# Patient Record
Sex: FEMALE | Race: WHITE | Employment: FULL TIME | URBAN - METROPOLITAN AREA
[De-identification: names, ages, dates, MRNs, and addresses within clinical notes are randomized per-mention and may not be internally consistent; named-entity substitution may affect disease eponyms.]

---

## 2017-10-16 ENCOUNTER — HOSPITAL ENCOUNTER (OUTPATIENT)
Dept: MAMMOGRAPHY | Age: 62
Discharge: HOME | End: 2017-10-16
Attending: FAMILY MEDICINE | Admitting: FAMILY MEDICINE

## 2017-10-16 DIAGNOSIS — Z12.31 ENCOUNTER FOR SCREENING MAMMOGRAM FOR MALIGNANT NEOPLASM OF BREAST: ICD-10-CM

## 2018-12-10 ENCOUNTER — HOSPITAL ENCOUNTER (OUTPATIENT)
Dept: WOMENS IMAGING | Age: 63
Discharge: HOME | End: 2018-12-10
Payer: COMMERCIAL

## 2018-12-10 DIAGNOSIS — Z12.31 ENCOUNTER FOR SCREENING MAMMOGRAM FOR MALIGNANT NEOPLASM OF BREAST: ICD-10-CM

## 2018-12-10 PROCEDURE — 77067 SCR MAMMO BI INCL CAD: CPT

## 2020-06-30 ENCOUNTER — HOSPITAL ENCOUNTER (OUTPATIENT)
Dept: WOMENS IMAGING | Age: 65
Discharge: HOME | End: 2020-06-30
Payer: COMMERCIAL

## 2020-06-30 ENCOUNTER — TELEPHONE (OUTPATIENT)
Dept: WOMENS IMAGING | Age: 65
End: 2020-06-30

## 2020-06-30 PROCEDURE — 77067 SCR MAMMO BI INCL CAD: CPT

## 2020-07-27 ENCOUNTER — OFFICE VISIT (OUTPATIENT)
Dept: ORTHOPEDIC SURGERY | Age: 65
End: 2020-07-27
Payer: COMMERCIAL

## 2020-07-27 VITALS — HEIGHT: 65 IN | WEIGHT: 293 LBS | BODY MASS INDEX: 48.82 KG/M2

## 2020-07-27 PROCEDURE — 99243 OFF/OP CNSLTJ NEW/EST LOW 30: CPT | Performed by: ORTHOPAEDIC SURGERY

## 2020-07-27 NOTE — PROGRESS NOTES
Chief Complaint    Knee Pain (lt knee pain, pops all the time, hurts to stand for long period of time; ongoing for awhile )      History of Present Illness:  Diego Gar is a 59 y.o. female who presents today for evaluation of left knee pain. Patient's primary care physician is Fior Carbajal. Patient states that she has been experiencing left knee pain for years and does not remember any  specific injury to the left knee. 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 She states that her pain on average is a 5-7/10 and increased with prolonged weightbearing activity and with ascending descending steps. Patient states that at times when she rises from seated position she has to stand and she states that she has to get her knee in alignment before she can walk. After few steps she states that the pain improves. She has not ambulating with an assistive device at the present time but she does favor her left lower extremity. Pain is mainly over the medial aspect of the left knee and over the anterior aspect of the knee with ascending descending steps. She presently takes a combination of 800 mg of ibuprofen daily with thousand milligrams of Tylenol a day. She has not seen her primary care physician in several years and at the present time she takes no prescriptive medication for any reason. She denies any  cardiac issues, pulmonary issues, diabetes, or GI problems. Pain Assessment  Location of Pain: Knee  Location Modifiers: Left  Severity of Pain: 6  Frequency of Pain: Intermittent  Aggravating Factors: Standing, Walking, Stairs  Limiting Behavior: Some  Result of Injury: No  Work-Related Injury: No  Are there other pain locations you wish to document?: No]       Medical History:  Patient's medications, allergies, past medical, surgical, social and family histories were reviewed and updated as appropriate.     Review of Systems:  Relevant review of systems reviewed and available in the patient's obtained and reviewed in office:  Views 4 views including AP weightbearing, PA flexed weightbearing, lateral, and skyline  Location left knee:    Impression:   There is end-stage osteoarthritis of the left knee with sclerosis and osteophyte formation present. There is subluxation of the tibiofemoral joint noted. The patellofemoral joint as moderate osteoarthritis with peripheral osteophyte formation noted. No fractures are identified. Impression:  Encounter Diagnoses   Name Primary?  Left knee pain, unspecified chronicity Yes    Morbid obesity with BMI of 50.0-59.9, adult (HCC)     Primary osteoarthritis of left knee        Office Procedures:  Orders Placed This Encounter   Procedures    XR KNEE LEFT (MIN 4 VIEWS)     Standing Status:   Future     Number of Occurrences:   1     Standing Expiration Date:   7/23/2021    [employer]     Referral Priority:   Routine     Referral Type:   Consult for Advice and Opinion     Referral Reason:   Specialty Services Required     Requested Specialty:   Nutrition     Number of Visits Requested:   1       Treatment Plan:  I discussed with the patient the nature of osteoarthritis of the knee. We talked about treatment of arthritis and the various options that are involved with this. The patient understands that the treatments can vary from essentially doing nothing to a total joint replacement arthroplasty for arthritis. I then went on to describe the utilization of glucosamine and chondroitin sulfate as a joint nutrition product. We talked about the fact that this is essentially a joint vitamin with typically minimal side effects. We also talked about utilization of prescription over-the-counter anti-inflammatory medications as the next option. We also discussed the possibility of brace wear or orthotic wear if the patient has significant varus alignment.  We then went on to discuss the possibility of Visco supplementation with hyaluronate

## 2022-10-18 ENCOUNTER — HOSPITAL ENCOUNTER (OUTPATIENT)
Dept: WOMENS IMAGING | Age: 67
Discharge: HOME | End: 2022-10-18
Payer: MEDICARE

## 2022-10-18 DIAGNOSIS — Z12.31 ENCOUNTER FOR SCREENING MAMMOGRAM FOR BREAST CANCER: ICD-10-CM

## 2022-10-18 PROCEDURE — 77063 BREAST TOMOSYNTHESIS BI: CPT

## 2023-11-07 ENCOUNTER — HOSPITAL ENCOUNTER (EMERGENCY)
Age: 68
Discharge: HOME | End: 2023-11-07
Payer: MEDICARE

## 2023-11-07 ENCOUNTER — APPOINTMENT (OUTPATIENT)
Dept: GENERAL RADIOLOGY | Age: 68
End: 2023-11-07
Payer: MEDICARE

## 2023-11-07 VITALS
HEIGHT: 65 IN | RESPIRATION RATE: 16 BRPM | OXYGEN SATURATION: 99 % | DIASTOLIC BLOOD PRESSURE: 95 MMHG | BODY MASS INDEX: 54.75 KG/M2 | SYSTOLIC BLOOD PRESSURE: 166 MMHG | HEART RATE: 92 BPM | TEMPERATURE: 97.9 F

## 2023-11-07 DIAGNOSIS — M79.605 LEFT LEG PAIN: Primary | ICD-10-CM

## 2023-11-07 PROCEDURE — 99283 EMERGENCY DEPT VISIT LOW MDM: CPT

## 2023-11-07 PROCEDURE — 73560 X-RAY EXAM OF KNEE 1 OR 2: CPT

## 2023-11-07 ASSESSMENT — PAIN - FUNCTIONAL ASSESSMENT: PAIN_FUNCTIONAL_ASSESSMENT: NONE - DENIES PAIN

## 2023-11-07 NOTE — ED PROVIDER NOTES
[address] [place] [address]  ED  EMERGENCY DEPARTMENT ENCOUNTER        Pt Name: Sheri Kaiesr  MRN: [id number]  [address] [facility] 1955  Date of evaluation: 11/7/2023  Provider: POWER Bowen  PCP: Shell Lynn DO  Note Started: 6:05 PM EST 11/7/23      PARMJIT. I have evaluated this patient. CHIEF COMPLAINT       Chief Complaint   Patient presents with    Leg Pain     Pt reports left lower leg pain, pt denies acute injury. Pt reports pain in located in the anterior aspect of her left lower leg. Pt denies any long periods of travel, immobilization. Pt reports pain is only present with ambulation. HISTORY OF PRESENT ILLNESS: 1 or more Elements     History from : Patient    Limitations to history : None    Sheri Kaiser is a 76 y.o. female who presents to the emergency department complaining of left leg pain. Patient states over the past several days she has had progressive pain in her left knee. She does not have any difficulty moving her knee or leg however pain is significantly worse when ambulating and weightbearing. Pain is primarily anterior left knee. She denies any significant swelling although difficult to assess due to patient's body habitus. Denies any recent surgical history. Denies any known injuries. She has not fallen. At rest she denies any pain. She has tried taking ibuprofen with some relief of symptoms however states today pain has been worse than over the past several days and she has been using a cane. Due to the worsening of pain she came into the emergency department for evaluation. Nursing Notes were all reviewed and agreed with or any disagreements were addressed in the HPI. REVIEW OF SYSTEMS :      Review of Systems    Positives and Pertinent negatives as per HPI. SURGICAL HISTORY   History reviewed. No pertinent surgical history.     CURRENTMEDICATIONS       Previous Medications    No medications on file       ALLERGIES     Patient has no known

## 2023-11-08 NOTE — ED NOTES
Discharge instructions explained by ED provider. Patient verbalized understanding and denies any other concerns or complaints at this time. Patient vital signs stable and no acute signs or symptoms of distress noted at discharge. Patient deemed clinically stable. Patient d/c home.        Ellene Oppenheim, RN  11/07/23 2001

## 2024-07-23 ENCOUNTER — HOSPITAL ENCOUNTER (OUTPATIENT)
Dept: PHYSICAL THERAPY | Age: 69
Setting detail: THERAPIES SERIES
Discharge: HOME | End: 2024-07-23
Payer: MEDICARE

## 2024-07-23 PROCEDURE — 97110 THERAPEUTIC EXERCISES: CPT

## 2024-07-23 PROCEDURE — 97161 PT EVAL LOW COMPLEX 20 MIN: CPT

## 2024-07-23 PROCEDURE — 97016 VASOPNEUMATIC DEVICE THERAPY: CPT

## 2024-07-23 NOTE — PLAN OF CARE
Independent in HEP and progression per patient tolerance, in order to prevent re-injury.   [] Progressing: [] Met: [] Not Met: [] Adjusted  2. Patient will have a decrease in pain to <2/10 to facilitate improvement in movement, function, and ADLs as indicated by Functional Deficits.  [] Progressing: [] Met: [] Not Met: [] Adjusted    Long Term Goals: To be achieved in: 8 weeks  1. Disability index score of 20% or less for the LEFS to assist with reaching prior level of function with activities such as ADL's.  [] Progressing: [] Met: [] Not Met: [] Adjusted  2. Patient will demonstrate increased AROM of L LE to WNL without pain to allow for proper joint functioning to enable patient to squat.   [] Progressing: [] Met: [] Not Met: [] Adjusted  3. Patient will demonstrate increased Strength of R LE to at least 4+/5 throughout without pain to allow for proper functional mobility to enable patient to return to household chores.   [] Progressing: [] Met: [] Not Met: [] Adjusted  4. Patient will return to work without increased symptoms or restriction.   [] Progressing: [] Met: [] Not Met: [] Adjusted  5. Patient to ambulate community distances on varied surfaces independently without AD, and will ambulate up/down 4 steps with an alternating pattern with unilateral rail independently with good control and without deviations.  [] Progressing: [] Met: [] Not Met: [] Adjusted     Overall Progression Towards Functional goals/ Treatment Progress Update:  [] Patient is progressing as expected towards functional goals listed.    [] Progression is slowed due to complexities/Impairments listed.  [] Progression has been slowed due to co-morbidities.  [x] Plan just implemented, too soon (<30days) to assess goals progression   [] Goals require adjustment due to lack of progress  [] Patient is not progressing as expected and requires additional follow up with physician  [] Other:     TREATMENT PLAN     Frequency/Duration: 2x/week for

## 2024-07-29 ENCOUNTER — HOSPITAL ENCOUNTER (OUTPATIENT)
Dept: PHYSICAL THERAPY | Age: 69
Setting detail: THERAPIES SERIES
End: 2024-07-29
Payer: MEDICARE

## 2024-07-31 ENCOUNTER — HOSPITAL ENCOUNTER (OUTPATIENT)
Dept: PHYSICAL THERAPY | Age: 69
Setting detail: THERAPIES SERIES
Discharge: HOME | End: 2024-07-31
Payer: MEDICARE

## 2024-07-31 PROCEDURE — 97110 THERAPEUTIC EXERCISES: CPT

## 2024-07-31 PROCEDURE — 97016 VASOPNEUMATIC DEVICE THERAPY: CPT

## 2024-07-31 NOTE — FLOWSHEET NOTE
[facility]- Outpatient Rehabilitation and Therapy [address] [place] [address], [address], [place] [address] office: [phone] fax: [phone]         Physical Therapy: TREATMENT/PROGRESS NOTE   Patient: Mercy Fuentes (68 y.o. female)   Examination Date: 2024   :  1955 MRN: [id number]   Visit #: 2   Insurance Allowable Auth Needed   Med nec  (No exclusions) []Yes    [x]No    Insurance: Payor: AETNA MEDICARE / Plan: AETNA MEDICARE-ADVANTAGE PPO / Product Type: Medicare /   Insurance ID: [id number] - (Medicare Managed)  Secondary Insurance (if applicable):    Treatment Diagnosis:   Muscle tightness (M62.9), weakness (R53.1) , abnormal gait (R26.9) ,balance disorder (R26.89), effusion (M25.40)   Medical Diagnosis:  L lower leg pain (M79.662)   Referring Physician: Shane Saucedo MD  PCP: Torres Zavala DO     Plan of care signed (Y/N): N    Date of Patient follow up with Physician: 2024     Progress Report/POC: NO  POC update due: (10 visits /OR AUTH LIMITS, whichever is less) 24                                              Medical History:  Comorbidities:  Osteoarthritis  COVID-19  Prior Surgeries: 4 c-sections  Relevant Medical History: none                                         Precautions/ Contra-indications:           Latex allergy:  NO  Pacemaker:    NO  Contraindications for Manipulation: recent fracture- 2023        SUBJECTIVE EXAMINATION     Patient stated complaint:   reports feeling the same.  Reports she had an appt with MD yesterday and xrays showed everything looked fine with continued healing.  Pt reports MD knows her lower leg is crooked and to fix it would be a big surgery with a rebreak and ofelia placement and she is not wanting to do that.        24  In 2023, patient fell getting out of the shower, and she sustained closed fracture of proximal end of left tibia/ Nondisplaced transverse fracture of shaft of left tibia.  She went to ED initially,

## 2024-08-05 ENCOUNTER — HOSPITAL ENCOUNTER (OUTPATIENT)
Dept: PHYSICAL THERAPY | Age: 69
Setting detail: THERAPIES SERIES
Discharge: HOME | End: 2024-08-05
Payer: MEDICARE

## 2024-08-05 PROCEDURE — 97016 VASOPNEUMATIC DEVICE THERAPY: CPT

## 2024-08-05 PROCEDURE — 97110 THERAPEUTIC EXERCISES: CPT

## 2024-08-05 NOTE — FLOWSHEET NOTE
[facility]- Outpatient Rehabilitation and Therapy [address] [place] [address], [address], [place] [address] office: [phone] fax: [phone]         Physical Therapy: TREATMENT/PROGRESS NOTE   Patient: Mercy Fuentes (68 y.o. female)   Examination Date: 2024   :  1955 MRN: [id number]   Visit #: 3   Insurance Allowable Auth Needed   Med nec  (No exclusions) []Yes    [x]No    Insurance: Payor: AETNA MEDICARE / Plan: AETNA MEDICARE-ADVANTAGE PPO / Product Type: Medicare /   Insurance ID: [id number] - (Medicare Managed)  Secondary Insurance (if applicable):    Treatment Diagnosis:   Muscle tightness (M62.9), weakness (R53.1) , abnormal gait (R26.9) ,balance disorder (R26.89), effusion (M25.40)   Medical Diagnosis:  L lower leg pain (M79.662)   Referring Physician: Shane Saucedo MD  PCP: Torres Zavala DO     Plan of care signed (Y/N): N    Date of Patient follow up with Physician: 2024     Progress Report/POC: NO  POC update due: (10 visits /OR AUTH LIMITS, whichever is less) 24                                              Medical History:  Comorbidities:  Osteoarthritis  COVID-19  Prior Surgeries: 4 c-sections  Relevant Medical History: none                                         Precautions/ Contra-indications:           Latex allergy:  NO  Pacemaker:    NO  Contraindications for Manipulation: recent fracture- 2023        SUBJECTIVE EXAMINATION     Patient stated complaint:   reports feeling the same.  Reports she is going to see a Dr. Oliveros for another appt due to deciding she may want surgery if her leg is going to continue to be crooked (this is a referral from her current MD).  She does not have the appt yet.        24  In 2023, patient fell getting out of the shower, and she sustained closed fracture of proximal end of left tibia/ Nondisplaced transverse fracture of shaft of left tibia.  She went to ED initially, they did not see the fracture.  She was

## 2024-08-08 ENCOUNTER — APPOINTMENT (OUTPATIENT)
Dept: PHYSICAL THERAPY | Age: 69
End: 2024-08-08
Payer: MEDICARE

## 2024-08-12 ENCOUNTER — HOSPITAL ENCOUNTER (OUTPATIENT)
Dept: PHYSICAL THERAPY | Age: 69
Setting detail: THERAPIES SERIES
Discharge: HOME | End: 2024-08-12
Payer: MEDICARE

## 2024-08-12 PROCEDURE — 97016 VASOPNEUMATIC DEVICE THERAPY: CPT

## 2024-08-12 PROCEDURE — 97110 THERAPEUTIC EXERCISES: CPT

## 2024-08-12 NOTE — FLOWSHEET NOTE
Charge Justification:  (42426) THERAPEUTIC EXERCISE - Provided verbal/tactile cueing for activities related to strengthening, flexibility, endurance, ROM performed to prevent loss of range of motion, maintain or improve muscular strength or increase flexibility, following either an injury or surgery.   (60477) HOME EXERCISE PROGRAM - Reviewed/Progressed HEP activities related to strengthening, flexibility, endurance, ROM performed to prevent loss of range of motion, maintain or improve muscular strength or increase flexibility, following either an injury or surgery.  (21263) NEUROMUSCULAR RE-EDUCATION - Therapeutic procedure, 1 or more areas, each 15 minutes; neuromuscular reeducation of movement, balance, coordination, kinesthetic sense, posture, and/or proprioception for sitting and/or standing activities  (15226) THERAPEUTIC ACTIVITY - use of dynamic activities to improve functional performance. (Ex include squatting, ascending/descending stairs, walking, bending, lifting, catching, throwing, pushing, pulling, jumping.)  Direct, one on one contact, billed in 15-minute increments.  (62662) MANUAL THERAPY -  Manual therapy techniques, 1 or more regions, each 15 minutes (Mobilization/manipulation, manual lymphatic drainage, manual traction) for the purpose of modulating pain, promoting relaxation,  increasing ROM, reducing/eliminating soft tissue swelling/inflammation/restriction, improving soft tissue extensibility and allowing for proper ROM for normal function with self care, mobility, lifting and ambulation  (74010) GAIT RE-EDUCATION - Provided training and instruction to the patient for proper joint and muscle recruitment and positioning and eccentric body weight control with ambulation re-education including up and down stairs. Therapeutic procedure, one or more areas, each 15 minutes;   (75247) VASOPNEUMATIC    GOALS     Patient stated goal: walk without an AD  [] Progressing: [] Met: [] Not Met: []

## 2024-08-14 ENCOUNTER — APPOINTMENT (OUTPATIENT)
Dept: PHYSICAL THERAPY | Age: 69
End: 2024-08-14
Payer: MEDICARE

## 2024-09-16 ENCOUNTER — HOSPITAL ENCOUNTER (OUTPATIENT)
Age: 69
Discharge: HOME | End: 2024-09-16
Payer: MEDICARE

## 2024-09-16 LAB
25(OH)D3 SERPL-MCNC: 42.1 NG/ML
ALBUMIN SERPL-MCNC: 4 G/DL (ref 3.4–5)
ALBUMIN/GLOB SERPL: 1.4 {RATIO} (ref 1.1–2.2)
ALP SERPL-CCNC: 88 U/L (ref 40–129)
ALT SERPL-CCNC: 18 U/L (ref 10–40)
ANION GAP SERPL CALCULATED.3IONS-SCNC: 11 MMOL/L (ref 3–16)
AST SERPL-CCNC: 21 U/L (ref 15–37)
BASOPHILS # BLD: 0 K/UL (ref 0–0.2)
BASOPHILS NFR BLD: 0.5 %
BILIRUB SERPL-MCNC: <0.2 MG/DL (ref 0–1)
BUN SERPL-MCNC: 25 MG/DL (ref 7–20)
CALCIUM SERPL-MCNC: 9.5 MG/DL (ref 8.3–10.6)
CHLORIDE SERPL-SCNC: 106 MMOL/L (ref 99–110)
CO2 SERPL-SCNC: 21 MMOL/L (ref 21–32)
CREAT SERPL-MCNC: 0.9 MG/DL (ref 0.6–1.2)
CRP SERPL-MCNC: 5.2 MG/L (ref 0–5.1)
DEPRECATED RDW RBC AUTO: 15.8 % (ref 12.4–15.4)
EOSINOPHIL # BLD: 0.2 K/UL (ref 0–0.6)
EOSINOPHIL NFR BLD: 2.4 %
ERYTHROCYTE [SEDIMENTATION RATE] IN BLOOD BY WESTERGREN METHOD: 26 MM/HR (ref 0–30)
GFR SERPLBLD CREATININE-BSD FMLA CKD-EPI: 69 ML/MIN/{1.73_M2}
GLUCOSE SERPL-MCNC: 86 MG/DL (ref 70–99)
HCT VFR BLD AUTO: 37.8 % (ref 36–48)
HGB BLD-MCNC: 12.6 G/DL (ref 12–16)
LYMPHOCYTES # BLD: 1.3 K/UL (ref 1–5.1)
LYMPHOCYTES NFR BLD: 18.3 %
MCH RBC QN AUTO: 28.9 PG (ref 26–34)
MCHC RBC AUTO-ENTMCNC: 33.3 G/DL (ref 31–36)
MCV RBC AUTO: 86.8 FL (ref 80–100)
MONOCYTES # BLD: 0.7 K/UL (ref 0–1.3)
MONOCYTES NFR BLD: 9.4 %
NEUTROPHILS # BLD: 4.8 K/UL (ref 1.7–7.7)
NEUTROPHILS NFR BLD: 69.4 %
PLATELET # BLD AUTO: 205 K/UL (ref 135–450)
PMV BLD AUTO: 9.9 FL (ref 5–10.5)
POTASSIUM SERPL-SCNC: 4.6 MMOL/L (ref 3.5–5.1)
PROT SERPL-MCNC: 6.9 G/DL (ref 6.4–8.2)
RBC # BLD AUTO: 4.36 M/UL (ref 4–5.2)
SODIUM SERPL-SCNC: 138 MMOL/L (ref 136–145)
TSH SERPL DL<=0.005 MIU/L-ACNC: 1.44 UIU/ML (ref 0.27–4.2)
WBC # BLD AUTO: 7 K/UL (ref 4–11)

## 2024-09-16 PROCEDURE — 84443 ASSAY THYROID STIM HORMONE: CPT

## 2024-09-16 PROCEDURE — 86140 C-REACTIVE PROTEIN: CPT

## 2024-09-16 PROCEDURE — 85025 COMPLETE CBC W/AUTO DIFF WBC: CPT

## 2024-09-16 PROCEDURE — 83970 ASSAY OF PARATHORMONE: CPT

## 2024-09-16 PROCEDURE — 85652 RBC SED RATE AUTOMATED: CPT

## 2024-09-16 PROCEDURE — 80053 COMPREHEN METABOLIC PANEL: CPT

## 2024-09-16 PROCEDURE — 36415 COLL VENOUS BLD VENIPUNCTURE: CPT

## 2024-09-16 PROCEDURE — 82306 VITAMIN D 25 HYDROXY: CPT

## 2024-09-17 LAB — PTH-INTACT SERPL-MCNC: 46.4 PG/ML (ref 14–72)

## 2025-05-15 ENCOUNTER — HOSPITAL ENCOUNTER (OUTPATIENT)
Dept: PHYSICAL THERAPY | Age: 70
Setting detail: THERAPIES SERIES
Discharge: HOME | End: 2025-05-15
Payer: MEDICARE

## 2025-05-15 DIAGNOSIS — M25.662 JOINT STIFFNESS OF LEFT LOWER LEG: ICD-10-CM

## 2025-05-15 DIAGNOSIS — R29.898 WEAKNESS OF BOTH LOWER EXTREMITIES: Primary | ICD-10-CM

## 2025-05-15 PROCEDURE — 97530 THERAPEUTIC ACTIVITIES: CPT

## 2025-05-15 PROCEDURE — 97110 THERAPEUTIC EXERCISES: CPT

## 2025-05-15 PROCEDURE — 97161 PT EVAL LOW COMPLEX 20 MIN: CPT

## 2025-05-15 NOTE — PLAN OF CARE
[facility] - Outpatient Rehabilitation and Therapy: [facility], [address], [place] [address] office: [phone] fax: [phone]     Physical Therapy Initial Evaluation Certification      Dear Torres Oliveros MD ,    We had the pleasure of evaluating the following patient for physical therapy services at [facility] Outpatient Physical Therapy.  A summary of our findings can be found in the initial assessment below.  This includes our plan of care.  If you have any questions or concerns regarding these findings, please do not hesitate to contact me at the office phone number listed above.  Thank you for the referral.     Physician Signature:_______________________________Date:__________________  By signing above (or electronic signature), therapist’s plan is approved by physician       Physical Therapy: TREATMENT/PROGRESS NOTE   Patient: Mercy Fuentes (69 y.o. female)   Examination Date: 05/15/2025   :  1955 MRN: [id number]   Visit #: 1   Insurance Allowable Auth Needed   [place] []Yes    [x]No    Insurance: Payor: AETNA MEDICARE / Plan: AETNA MEDICARE-ADVANTAGE PPO / Product Type: Medicare /   Insurance ID: [id number] - (Medicare Managed)  Secondary Insurance (if applicable):    Treatment Diagnosis: Left leg weakness and limited mobility    ICD-10-CM    1. Weakness of both lower extremities  R29.898       2. Joint stiffness of left lower leg  M25.662          Medical Diagnosis:  Balance disorder [R26.89]   Referring Physician: Torres Oliveros MD  PCP: Sherry Meza APRN - CNP     Plan of care signed (Y/N):     Date of Patient follow up with Physician:      Plan of Care Report: EVAL today  POC update due: (10 visits /OR AUTH LIMITS, whichever is less)   2025                                             Medical History:  Comorbidities:  Osteoarthritis  Relevant Medical History: .No past medical history on file.                                           Precautions/

## 2025-05-19 ENCOUNTER — HOSPITAL ENCOUNTER (OUTPATIENT)
Dept: PHYSICAL THERAPY | Age: 70
Setting detail: THERAPIES SERIES
Discharge: HOME | End: 2025-05-19
Payer: MEDICARE

## 2025-05-19 PROCEDURE — 97112 NEUROMUSCULAR REEDUCATION: CPT

## 2025-05-19 PROCEDURE — 97110 THERAPEUTIC EXERCISES: CPT

## 2025-05-19 NOTE — FLOWSHEET NOTE
EC  x            No airex: Tandem EO only  x            HS/calf stretch seated with strap  30\" 2  each          Windshield wipers  3 10                                                            Kinesiotaping trail for right PFS  x            Manual Intervention (46880)  TIME                                                                                                Therapeutic Activity (38030)  Sets/time     Patient education   10'  Plan of rehab and all questions answered.                                 Modalities:    No modalities applied this session    Education/Home Exercise Program: Access Code: [email or web address]  URL: [email or web address]  Date: 05/19/2025  Prepared by: Malini Anders    Exercises  - Supine Gluteal Sets  - 1 x daily - 7 x weekly - 2 sets - 10 reps - 3 sec hold  - Seated Hip Abduction with Resistance  - 1 x daily - 7 x weekly - 3 sets - 10 reps  - Seated Isometric Hip Adduction with Ball  - 1 x daily - 7 x weekly - 3 sets - 10 reps  - Seated March  - 1 x daily - 7 x weekly - 3 sets - 10 reps  - Sit to Stand Without Arm Support  - 1 x daily - 7 x weekly - 2 sets - 10 reps  - Standing Hip Abduction with Counter Support  - 1 x daily - 7 x weekly - 3 sets - 10 reps  - Standing Hip Extension with Counter Support  - 1 x daily - 7 x weekly - 3 sets - 10 reps  - Seated Ankle Alphabet  - 1 x daily - 7 x weekly - 2 sets - 10 reps  - Sidelying Hip Abduction  - 1 x daily - 7 x weekly - 3 sets - 10 reps  - Small Range Straight Leg Raise  - 1 x daily - 7 x weekly - 3 sets - 10 reps - 5 sec hold  - Ankle Inversion Eversion Towel Slide  - 1 x daily - 7 x weekly - 3 sets - 10 reps  - Mini Squat with Counter Support  - 1 x daily - 7 x weekly - 3 sets - 10 reps       ASSESSMENT      Today's Assessment: Patient had good tolerance to today's session, reporting improved ROM, appropriate fatigue, and challenge with program completed. Able to progress  exercise difficulty on balance activities today.  Continue to

## 2025-05-22 ENCOUNTER — HOSPITAL ENCOUNTER (OUTPATIENT)
Dept: PHYSICAL THERAPY | Age: 70
Setting detail: THERAPIES SERIES
Discharge: HOME | End: 2025-05-22
Payer: MEDICARE

## 2025-05-22 PROCEDURE — 97110 THERAPEUTIC EXERCISES: CPT

## 2025-05-22 PROCEDURE — 97112 NEUROMUSCULAR REEDUCATION: CPT

## 2025-05-22 NOTE — FLOWSHEET NOTE
x     Romberg: EO, EC  x            No airex: Tandem EO only  x            HS/calf stretch seated with strap  30\" 2  each          Windshield wipers  3 10                                                            Kinesiotaping trail for right PFS  x            Manual Intervention (74771)  TIME                                                                                                Therapeutic Activity (19512)  Sets/time     Patient education   10'  Plan of rehab and all questions answered.                                 Modalities:    No modalities applied this session    Education/Home Exercise Program: Access Code: [email or web address]  URL: [email or web address]  Date: 05/19/2025  Prepared by: Malini Anders    Exercises  - Supine Gluteal Sets  - 1 x daily - 7 x weekly - 2 sets - 10 reps - 3 sec hold  - Seated Hip Abduction with Resistance  - 1 x daily - 7 x weekly - 3 sets - 10 reps  - Seated Isometric Hip Adduction with Ball  - 1 x daily - 7 x weekly - 3 sets - 10 reps  - Seated March  - 1 x daily - 7 x weekly - 3 sets - 10 reps  - Sit to Stand Without Arm Support  - 1 x daily - 7 x weekly - 2 sets - 10 reps  - Standing Hip Abduction with Counter Support  - 1 x daily - 7 x weekly - 3 sets - 10 reps  - Standing Hip Extension with Counter Support  - 1 x daily - 7 x weekly - 3 sets - 10 reps  - Seated Ankle Alphabet  - 1 x daily - 7 x weekly - 2 sets - 10 reps  - Sidelying Hip Abduction  - 1 x daily - 7 x weekly - 3 sets - 10 reps  - Small Range Straight Leg Raise  - 1 x daily - 7 x weekly - 3 sets - 10 reps - 5 sec hold  - Ankle Inversion Eversion Towel Slide  - 1 x daily - 7 x weekly - 3 sets - 10 reps  - Mini Squat with Counter Support  - 1 x daily - 7 x weekly - 3 sets - 10 reps       ASSESSMENT      Today's Assessment: Patient had good tolerance to today's session, reporting improved ROM, appropriate fatigue, and challenge with program completed. Able to progress  exercise difficulty on balance activities

## 2025-05-29 ENCOUNTER — HOSPITAL ENCOUNTER (OUTPATIENT)
Dept: PHYSICAL THERAPY | Age: 70
Setting detail: THERAPIES SERIES
Discharge: HOME | End: 2025-05-29
Payer: MEDICARE

## 2025-05-29 PROCEDURE — 97112 NEUROMUSCULAR REEDUCATION: CPT

## 2025-05-29 PROCEDURE — 97110 THERAPEUTIC EXERCISES: CPT

## 2025-05-29 NOTE — FLOWSHEET NOTE
[facility] - Outpatient Rehabilitation and Therapy: [address] [place] [address], [address], [place] [address] office: [phone] fax: [phone]           Physical Therapy: TREATMENT/PROGRESS NOTE   Patient: Mercy Fuentes (69 y.o. female)   Examination Date: 2025   :  1955 MRN: [id number]   Visit #: 4   Insurance Allowable Auth Needed   [place] []Yes    [x]No    Insurance: Payor: AETNA MEDICARE / Plan: AETNA MEDICARE-ADVANTAGE PPO / Product Type: Medicare /   Insurance ID: [id number] - (Medicare Managed)  Secondary Insurance (if applicable):    Treatment Diagnosis: Left leg weakness and limited mobility    ICD-10-CM    1. Weakness of both lower extremities  R29.898       2. Joint stiffness of left lower leg  M25.662          Medical Diagnosis:  Balance disorder [R26.89]   Referring Physician: Torres Oliveros MD  PCP: Sherry Meza APRN - CNP     Plan of care signed (Y/N):     Date of Patient follow up with Physician:      Plan of Care Report: NO  POC update due: (10 visits /OR AUTH LIMITS, whichever is less)   2025                                             Medical History:  Comorbidities:  Osteoarthritis  Relevant Medical History: .No past medical history on file.                                           Precautions/ Contra-indications:           Latex allergy:  NO  Pacemaker:    NO  Contraindications for Manipulation: recent surgical history (relative) and unhealthy/ multiple comorbidities   Date of Surgery: 2025  Other:    Red Flags:  None    Suicide Screening:   The patient did not verbalize a primary behavioral concern, suicidal ideation, suicidal intent, or demonstrate suicidal behaviors.    Preferred Language for Healthcare:   [x] English       [] other:    SUBJECTIVE EXAMINATION     Patient stated complaint: Patient reports doing ok today.  No new issues.  Wounds appear to be healing without obvious signs of infection.    5/15/25: The patient is being seen in physical therapy

## 2025-06-02 ENCOUNTER — HOSPITAL ENCOUNTER (OUTPATIENT)
Dept: PHYSICAL THERAPY | Age: 70
Setting detail: THERAPIES SERIES
Discharge: HOME | End: 2025-06-02
Payer: MEDICARE

## 2025-06-02 PROCEDURE — 97110 THERAPEUTIC EXERCISES: CPT

## 2025-06-02 PROCEDURE — 97112 NEUROMUSCULAR REEDUCATION: CPT

## 2025-06-02 NOTE — FLOWSHEET NOTE
3 10    Hip abd  1 10 Difficult R   SLR   2 10 (B)   Ball rolls    3 10    Clams  10\" 15    Airex balance:  Shd width: EO, EC *    x     Romberg: EO, EC,   w/perturbations  *  x            ON airex: Tandem EO only *  x            HS/calf stretch seated with strap  30\" 2  each          Windshield wipers  3 10    HS stretch seated  30\" 2                  Agility ladder:  IIOO, OIIO, S/S *  10'     Stairs *  3 4 steps     Step up/over airex f/b, s/s *  1 10 Each                 Kinesiotaping trail for right PFS  x            Manual Intervention (28401)  TIME                                                                                                Therapeutic Activity (90346)  Sets/time     Patient education   10'  Plan of rehab and all questions answered.                                 Modalities:    No modalities applied this session    Education/Home Exercise Program: Access Code: [email or web address]  URL: [email or web address]  Date: 05/19/2025  Prepared by: Malini Anders    Exercises  - Supine Gluteal Sets  - 1 x daily - 7 x weekly - 2 sets - 10 reps - 3 sec hold  - Seated Hip Abduction with Resistance  - 1 x daily - 7 x weekly - 3 sets - 10 reps  - Seated Isometric Hip Adduction with Ball  - 1 x daily - 7 x weekly - 3 sets - 10 reps  - Seated March  - 1 x daily - 7 x weekly - 3 sets - 10 reps  - Sit to Stand Without Arm Support  - 1 x daily - 7 x weekly - 2 sets - 10 reps  - Standing Hip Abduction with Counter Support  - 1 x daily - 7 x weekly - 3 sets - 10 reps  - Standing Hip Extension with Counter Support  - 1 x daily - 7 x weekly - 3 sets - 10 reps  - Seated Ankle Alphabet  - 1 x daily - 7 x weekly - 2 sets - 10 reps  - Sidelying Hip Abduction  - 1 x daily - 7 x weekly - 3 sets - 10 reps  - Small Range Straight Leg Raise  - 1 x daily - 7 x weekly - 3 sets - 10 reps - 5 sec hold  - Ankle Inversion Eversion Towel Slide  - 1 x daily - 7 x weekly - 3 sets - 10 reps  - Mini Squat with Counter Support  - 1 x daily

## 2025-06-05 ENCOUNTER — HOSPITAL ENCOUNTER (OUTPATIENT)
Dept: PHYSICAL THERAPY | Age: 70
Setting detail: THERAPIES SERIES
Discharge: HOME | End: 2025-06-05
Payer: MEDICARE

## 2025-06-05 PROCEDURE — 97110 THERAPEUTIC EXERCISES: CPT

## 2025-06-05 PROCEDURE — 97112 NEUROMUSCULAR REEDUCATION: CPT

## 2025-06-05 NOTE — FLOWSHEET NOTE
[facility] - Outpatient Rehabilitation and Therapy: [facility], [address], [place] [address] office: [phone] fax: [phone]           Physical Therapy: TREATMENT/PROGRESS NOTE   Patient: Mercy Fuentes (69 y.o. female)   Examination Date: 2025   :  1955 MRN: [id number]   Visit #: 6   Insurance Allowable Auth Needed   MN []Yes    [x]No    Insurance: Payor: AETNA MEDICARE / Plan: AETNA MEDICARE-ADVANTAGE PPO / Product Type: Medicare /   Insurance ID: [id number] - (Medicare Managed)  Secondary Insurance (if applicable):    Treatment Diagnosis: Left leg weakness and limited mobility    ICD-10-CM    1. Weakness of both lower extremities  R29.898       2. Joint stiffness of left lower leg  M25.662          Medical Diagnosis:  Balance disorder [R26.89]   Referring Physician: Torres Oliveros MD  PCP: Sherry Meza APRN - CNP     Plan of care signed (Y/N): N, routed today     Date of Patient follow up with Physician:  as needed     Plan of Care Report: NO  POC update due: (10 visits /OR AUTH LIMITS, whichever is less)   2025                                             Medical History:  Comorbidities:  Osteoarthritis  Relevant Medical History: .No past medical history on file.                                           Precautions/ Contra-indications:           Latex allergy:  NO  Pacemaker:    NO  Contraindications for Manipulation: recent surgical history (relative) and unhealthy/ multiple comorbidities   Date of Surgery: 2025  Other:    Red Flags:  None    Suicide Screening:   The patient did not verbalize a primary behavioral concern, suicidal ideation, suicidal intent, or demonstrate suicidal behaviors.    Preferred Language for Healthcare:   [x] English       [] other:    SUBJECTIVE EXAMINATION     Patient stated complaint: Patient reports her L leg is doing well but her R leg is giving her issues.  Reports wounds are getting better.      5/15/25: The patient is

## 2025-06-09 ENCOUNTER — HOSPITAL ENCOUNTER (OUTPATIENT)
Dept: PHYSICAL THERAPY | Age: 70
Setting detail: THERAPIES SERIES
Discharge: HOME | End: 2025-06-09
Payer: MEDICARE

## 2025-06-09 PROCEDURE — 97110 THERAPEUTIC EXERCISES: CPT

## 2025-06-09 PROCEDURE — 97112 NEUROMUSCULAR REEDUCATION: CPT

## 2025-06-09 NOTE — FLOWSHEET NOTE
listed.  [] Progression has been slowed due to co-morbidities.  [] Plan just implemented, too soon (<30days) to assess goals progression   [] Goals require adjustment due to lack of progress  [] Patient is not progressing as expected and requires additional follow up with physician  [] Other:     TREATMENT PLAN     Frequency/Duration: 2x/week for 4-6 weeks for the following treatment interventions:    Interventions:  Therapeutic Exercise (55037) including: strength training, ROM, and functional mobility  Therapeutic Activities (18431) including: functional mobility training and education.  Neuromuscular Re-education (54805) activation and proprioception, including postural re-education.    Gait Training (44346) for normalization of ambulation patterns and AD training.   Manual Therapy (29938) as indicated to include: Passive Range of Motion, Gr I-IV mobilizations, Soft Tissue Mobilization, Dry Needling/IASTM, Trigger Point Release, and Myofascial Release  Modalities as needed that may include: Cryotherapy, Electrical Stimulation, and Vasoneumatic Compression  Patient education on joint protection, postural re-education, activity modification, and progression of HEP    Plan: Cont POC- Continue emphasis/focus on exercise progression, improving proper muscle recruitment and activation/motor control patterns, reduce/eliminate soft tissue swelling/inflammation/restriction, and improving soft tissue extensibility. Next visit plan to progress weights, progress reps, add new exercises, and continue current phase .    Electronically Signed by Malini Anders PT         Date: 06/09/2025     Note: Portions of this note have been templated and/or copied from initial evaluation, reassessments and prior notes for documentation efficiency.    Note: If patient does not return for scheduled/recommended follow up visits, this note will serve as a discharge from care along with the most recent update on progress.

## 2025-06-12 ENCOUNTER — HOSPITAL ENCOUNTER (OUTPATIENT)
Dept: PHYSICAL THERAPY | Age: 70
Setting detail: THERAPIES SERIES
Discharge: HOME | End: 2025-06-12
Payer: MEDICARE

## 2025-06-12 PROCEDURE — 97112 NEUROMUSCULAR REEDUCATION: CPT

## 2025-06-12 PROCEDURE — 97110 THERAPEUTIC EXERCISES: CPT

## 2025-06-12 NOTE — FLOWSHEET NOTE
[facility] - Outpatient Rehabilitation and Therapy: [address] [place] [address], [address], [place] [address] office: [phone] fax: [phone]           Physical Therapy: TREATMENT/PROGRESS NOTE   Patient: Mercy Fuentes (69 y.o. female)   Examination Date: 2025   :  1955 MRN: [id number]   Visit #: 8   Insurance Allowable Auth Needed   MN []Yes    [x]No    Insurance: Payor: AETNA MEDICARE / Plan: AETNA MEDICARE-ADVANTAGE PPO / Product Type: Medicare /   Insurance ID: [id number] - (Medicare Managed)  Secondary Insurance (if applicable):    Treatment Diagnosis: Left leg weakness and limited mobility    ICD-10-CM    1. Weakness of both lower extremities  R29.898       2. Joint stiffness of left lower leg  M25.662          Medical Diagnosis:  Balance disorder [R26.89]   Referring Physician: Torres Oliveros MD  PCP: Sherry Meza APRN - CNP     Plan of care signed (Y/N): JENNIFER weinstein,    Date of Patient follow up with Physician:  as needed     Plan of Care Report: NO  POC update due: (10 visits /OR AUTH LIMITS, whichever is less)   2025                                             Medical History:  Comorbidities:  Osteoarthritis  Relevant Medical History: .No past medical history on file.                                           Precautions/ Contra-indications:           Latex allergy:  NO  Pacemaker:    NO  Contraindications for Manipulation: recent surgical history (relative) and unhealthy/ multiple comorbidities   Date of Surgery: 2025  Other:    Red Flags:  None    Suicide Screening:   The patient did not verbalize a primary behavioral concern, suicidal ideation, suicidal intent, or demonstrate suicidal behaviors.    Preferred Language for Healthcare:   [x] English       [] other:    SUBJECTIVE EXAMINATION     Patient stated complaint: Patient reports her pain is about the same    5/15/25: The patient is being seen in physical therapy for S/p tibia repair.  The initial injury occurred on

## 2025-06-16 ENCOUNTER — HOSPITAL ENCOUNTER (OUTPATIENT)
Dept: PHYSICAL THERAPY | Age: 70
Setting detail: THERAPIES SERIES
Discharge: HOME | End: 2025-06-16
Payer: MEDICARE

## 2025-06-16 PROCEDURE — 97112 NEUROMUSCULAR REEDUCATION: CPT

## 2025-06-16 PROCEDURE — 97110 THERAPEUTIC EXERCISES: CPT

## 2025-06-16 PROCEDURE — 97116 GAIT TRAINING THERAPY: CPT

## 2025-06-16 NOTE — PLAN OF CARE
[facility] - Outpatient Rehabilitation and Therapy: [facility], [address], [place] [address] office: [phone] fax: [phone]     Physical Therapy Re-Certification Plan of Care    Dear Torres Oliveros MD  ,    We had the pleasure of treating the following patient for physical therapy services at [facility]. A summary of our findings can be found in the updated assessment below.  This includes our plan of care.  If you have any questions or concerns regarding these findings, please do not hesitate to contact me at the office phone number checked above.  Thank you for the referral.     Physician Signature:________________________________Date:__________________  By signing above (or electronic signature), therapist's plan is approved by physician      Total Visits: 9     Overall Response to Treatment:  Patient is responding well to treatment and improvement is noted with regards to goals    Recommendation:    [x] Continue PT 2x / wk for 4 weeks.   [] Hold PT, pending MD visit   [] Discharge to [facility]. Follow up with PT or MD PRN.         Physical Therapy: TREATMENT/PROGRESS NOTE   Patient: Mercy Fuentes (69 y.o. female)   Examination Date: 2025   :  1955 MRN: [id number]   Visit #: 9   Insurance Allowable Auth Needed   MN []Yes    [x]No    Insurance: Payor: AETNA MEDICARE / Plan: AETNA MEDICARE-ADVANTAGE PPO / Product Type: Medicare /   Insurance ID: [id number] - (Medicare Managed)  Secondary Insurance (if applicable):    Treatment Diagnosis: Left leg weakness and limited mobility    ICD-10-CM    1. Weakness of both lower extremities  R29.898       2. Joint stiffness of left lower leg  M25.662          Medical Diagnosis:  Balance disorder [R26.89]   Referring Physician: Torres Oliveros MD  PCP: Sherry Meza, DARYL - CNP     Plan of care signed (Y/N): Y eval,    Date of Patient follow up with Physician:  as needed     Plan of Care Report: YES, Date

## 2025-06-19 ENCOUNTER — HOSPITAL ENCOUNTER (OUTPATIENT)
Dept: PHYSICAL THERAPY | Age: 70
Setting detail: THERAPIES SERIES
Discharge: HOME | End: 2025-06-19
Payer: MEDICARE

## 2025-06-19 PROCEDURE — 97112 NEUROMUSCULAR REEDUCATION: CPT

## 2025-06-19 PROCEDURE — 97116 GAIT TRAINING THERAPY: CPT

## 2025-06-19 PROCEDURE — 97110 THERAPEUTIC EXERCISES: CPT

## 2025-06-19 NOTE — FLOWSHEET NOTE
with function overall.      5/15/25: The patient is being seen in physical therapy for S/p tibia repair.  The initial injury occurred on 11/2023.  Surgery on 9/2024 and then the removal of the External fixation on 4/2025.  Currently, symptoms include weakness and balance deficits on the left lower leg.  She has some difficulty with balance.  Aggravating factors include transfers and standing too long. Easing factors include elevating, and Tylenol or Ibuprofen PRN.       Test used Initial score  5/15/25 06/19/2025   Pain Summary VAS 0/10 at rest  0/10 with activity L  0/10  R 1/10   Functional questionnaire LEFS 36/80=45  100-45=55% impairment 43/80=54  100-54=46%  impairment   Other: Tinnetti 15/28 18/28                OBJECTIVE EXAMINATION     5/15/25  ROM/Strength: (Blank cells denote NT)       Mvmt (norm) AROM L AROM R Notes PROM L     LUMBAR Flex (90)       Ext (25)       SB (25)        Rotation (30)           HIP Flex (120)         Abd (45)        ER (50)        IR (45)        Ext (20)       KNEE Flex (140)  105 95  106    Ext (0) 0 0       ANKLE DF (20)        PF (50)        Inversion (30)        Eversion (20)              MMT L MMT R Notes       HIP  Flexion 3-now 4 3-now 4      Abduction 3-now 3 3-now 3      ER        IR        Extension      KNEE  Flexion 4 now 4+ 4 now 4+      Extension 4 now 4+ 4 now 4+      ANKLE  DF 4- 4+      PF 4+ 4+      Inversion        Eversion             Balance:   Dysfunction noted: 40 ft walk: 13.01 sec    Falls Risk Assessment (30 days):   Falls Risk assessed and patient requires intervention due to being higher risk   Time Up and Go (TUG):   Recorded Time: 14.90 sec  13-14 seconds (~ 1 to 20% functional limitation)       5/29/25:              5/19/25:          Exercises/Interventions     Therapeutic Ex (71190)   NMR re-education (14269)  Gait training (27093) resistance Sets/time Reps Notes/Cues/Progressions   HEP  10 '  Reviewed and upgraded          NuStep   L6  10'     IB

## 2025-07-09 NOTE — DISCHARGE INSTRUCTIONS
Wound Care Center Physician Orders and Discharge Instructions  [facility]  [address], [address]  [place] [address]  Telephone: [phone]      Fax: [phone]      Your home care company:  N/a .    Your wound-care supplies will be provided by:  We are ordering your wound-care supplies from [employer]. Please note, depending on your insurance coverage, you may have out-of-pocket expenses for these supplies. Someone from [employer] may call you to confirm your order and discuss those potential costs before they ship your products -- please anticipate that call. If your out-of-pocket cost is going to be less than $200, and [employer] cannot reach you, they may ship your supplies as soon as the order is processed, and will send you a bill. If your out-of-pocket cost is going to be more than $200, [employer] should not ship your supplies until they speak with you. If you have any questions about your supplies or your potential out-of-pocket costs, or if you need to place an order for a refill of supplies (typically monthly), [place] is your local representative, and can be reached at [phone]. Information on [employer]'s return policy will also be enclosed with your supplies -- please read that carefully before opening your items.  .    NAME:  Mercy Fuentes   YOB: 1955  PRIMARY DIAGNOSIS FOR WOUND CARE CENTER:  Nonhealing  .    Wound cleansing:   Do not scrub or use excessive force.  Wash hands with soap and water before and after dressing changes.  Prior to applying a clean dressing, cleanse wound with normal saline, wound cleanser, or mild soap and water. Ask your physician or nurse before getting the wound(s) wet in the shower.     Wound care for home:    Left lower leg wounds:    Nexodyn to wound in the WCC    Mix Triad 80% and Gentamicin 20% to wound    Cover with bordered gauze    Medium single medigrip toes to knees(please give patient extra one)    Change dressing

## 2025-07-10 ENCOUNTER — HOSPITAL ENCOUNTER (OUTPATIENT)
Dept: WOUND CARE | Age: 70
Discharge: HOME | End: 2025-07-10
Payer: MEDICARE

## 2025-07-10 VITALS
TEMPERATURE: 97.8 F | RESPIRATION RATE: 18 BRPM | HEART RATE: 98 BPM | HEIGHT: 65 IN | DIASTOLIC BLOOD PRESSURE: 93 MMHG | SYSTOLIC BLOOD PRESSURE: 157 MMHG | WEIGHT: 293 LBS | BODY MASS INDEX: 48.82 KG/M2

## 2025-07-10 DIAGNOSIS — I89.0 LYMPHEDEMA: Primary | ICD-10-CM

## 2025-07-10 DIAGNOSIS — L97.222 NON-PRESSURE CHRONIC ULCER OF LEFT CALF WITH FAT LAYER EXPOSED (HCC): ICD-10-CM

## 2025-07-10 DIAGNOSIS — I73.89 OTHER SPECIFIED PERIPHERAL VASCULAR DISEASES: ICD-10-CM

## 2025-07-10 PROCEDURE — 99213 OFFICE O/P EST LOW 20 MIN: CPT

## 2025-07-10 PROCEDURE — 11042 DBRDMT SUBQ TIS 1ST 20SQCM/<: CPT

## 2025-07-10 RX ORDER — SODIUM CHLOR/HYPOCHLOROUS ACID 0.033 %
SOLUTION, IRRIGATION IRRIGATION PRN
Status: DISCONTINUED | OUTPATIENT
Start: 2025-07-10 | End: 2025-07-11 | Stop reason: HOSPADM

## 2025-07-10 RX ORDER — LIDOCAINE HYDROCHLORIDE 20 MG/ML
JELLY TOPICAL PRN
OUTPATIENT
Start: 2025-07-10

## 2025-07-10 RX ORDER — GENTAMICIN SULFATE 1 MG/G
OINTMENT TOPICAL PRN
OUTPATIENT
Start: 2025-07-10

## 2025-07-10 RX ORDER — BACITRACIN ZINC 500 [USP'U]/G
OINTMENT TOPICAL PRN
OUTPATIENT
Start: 2025-07-10

## 2025-07-10 RX ORDER — BETAMETHASONE DIPROPIONATE 0.5 MG/G
CREAM TOPICAL PRN
OUTPATIENT
Start: 2025-07-10

## 2025-07-10 RX ORDER — BACITRACIN ZINC AND POLYMYXIN B SULFATE 500; 1000 [USP'U]/G; [USP'U]/G
OINTMENT TOPICAL PRN
OUTPATIENT
Start: 2025-07-10

## 2025-07-10 RX ORDER — SODIUM CHLOR/HYPOCHLOROUS ACID 0.033 %
SOLUTION, IRRIGATION IRRIGATION PRN
OUTPATIENT
Start: 2025-07-10

## 2025-07-10 RX ORDER — LIDOCAINE HYDROCHLORIDE 40 MG/ML
SOLUTION TOPICAL PRN
OUTPATIENT
Start: 2025-07-10

## 2025-07-10 RX ORDER — LIDOCAINE 40 MG/G
CREAM TOPICAL PRN
Status: DISCONTINUED | OUTPATIENT
Start: 2025-07-10 | End: 2025-07-11 | Stop reason: HOSPADM

## 2025-07-10 RX ORDER — TRIAMCINOLONE ACETONIDE 1 MG/G
OINTMENT TOPICAL PRN
OUTPATIENT
Start: 2025-07-10

## 2025-07-10 RX ORDER — OXYBUTYNIN CHLORIDE 5 MG/1
5 TABLET, EXTENDED RELEASE ORAL DAILY
COMMUNITY

## 2025-07-10 RX ORDER — CLOBETASOL PROPIONATE 0.5 MG/G
OINTMENT TOPICAL PRN
OUTPATIENT
Start: 2025-07-10

## 2025-07-10 RX ORDER — SILVER SULFADIAZINE 10 MG/G
CREAM TOPICAL PRN
OUTPATIENT
Start: 2025-07-10

## 2025-07-10 RX ORDER — NEOMYCIN/BACITRACIN/POLYMYXINB 3.5-400-5K
OINTMENT (GRAM) TOPICAL PRN
OUTPATIENT
Start: 2025-07-10

## 2025-07-10 RX ORDER — MUPIROCIN 2 %
OINTMENT (GRAM) TOPICAL PRN
OUTPATIENT
Start: 2025-07-10

## 2025-07-10 RX ORDER — LIDOCAINE 50 MG/G
OINTMENT TOPICAL PRN
OUTPATIENT
Start: 2025-07-10

## 2025-07-10 RX ORDER — LIDOCAINE 40 MG/G
CREAM TOPICAL PRN
OUTPATIENT
Start: 2025-07-10

## 2025-07-10 NOTE — PROGRESS NOTES
Wound Care Supplies      Supply Company:     [facility] [address] [facility] [address] [place] [address]  p: [phone] f: [phone]     Ordering Center:     [facility]  [address] [place] [address]  [phone]  WOUND CARE Dept: [phone]   FAX NUMBER     Patient Information:      Luis Lamar  [address]  [facility] [address]   [phone]   Deliver to:   [address] [place] [address]  : 1955  AGE: 69 y.o.     GENDER: female   EPISODE DATE: 7/10/2025    Insurance:      PRIMARY INSURANCE:  Plan: [employer] MEDICARE-ADVANTAGE PPO  Coverage: AETNA MEDICARE  Effective Date: 2015  Group Number: [unfilled]  Subscriber Number: [id number] - (Medicare Managed)    Payer/Plan Subscr  Sex Relation Sub. Ins. ID Effective Group Num   1. AETNA MEDICAR* LUIS LAMAR 1955 Female Self [id number] 1/1/15 [id number]                                   [address]       Patient Wound Information:      Problem List Items Addressed This Visit    None      WOUNDS REQUIRING DRESSING SUPPLIES:     Wound 07/10/25 #1 Left lateral proximal lower leg, non-healing surgical, partial thickness (onset ) (Active)   Wound Image   07/10/25 08   Wound Etiology Non-Healing Surgical 07/10/25 0819   Wound Cleansed Vashe 07/10/25 0910   Dressing/Treatment Other (comment) 07/10/25 0910   Wound Length (cm) 1.2 cm 07/10/25 0819   Wound Width (cm) 1.2 cm 07/10/25 0819   Wound Depth (cm) 0.1 cm 07/10/25 0819   Wound Surface Area (cm^2) 1.44 cm^2 07/10/25 08   Wound Volume (cm^3) 0.144 cm^3 07/10/25 0819   Post-Procedure Length (cm) 1.2 cm 07/10/25 0851   Post-Procedure Width (cm) 1.2 cm 07/10/25 0851   Post-Procedure Depth (cm) 0.2 cm 07/10/25 08   Post-Procedure Surface Area (cm^2) 1.44 cm^2 07/10/25 08   Post-Procedure Volume (cm^3) 0.288 cm^3 07/10/25 08   Distance Tunneling (cm) 0 cm 07/10/25 08   Tunneling Position ___ O'Clock 0 07/10/25 08

## 2025-07-10 NOTE — PROGRESS NOTES
[facility] Wound Care Center  Progress Note and Procedure Note      Mercy Fuentes  AGE: 69 y.o.   GENDER: female  : 1955  TODAY'S DATE:  7/10/2025    Subjective:     Chief Complaint   Patient presents with    Wound Check         HISTORY of PRESENT ILLNESS HPI     Mercy Fuentes is a 69 y.o. female who presents today for wound evaluation.   History of Wound: Patient admits to having a slow healing wound following surgical revision of a failed fracture repair of her lower leg starting with initial injury and .  She has recently been using a topical antibiotic prescribed by her podiatrist.  She has been referred to the wound care center for further treatment due to nonhealing wound.  Wound Pain:  intermittent  Severity:   10   Wound Type:  non-healing surgical and traumatic  Modifying Factors:  edema, venous stasis, lymphedema, and obesity  Associated Signs/Symptoms:  edema and drainage        PAST MEDICAL HISTORY    History reviewed. No pertinent past medical history.    PAST SURGICAL HISTORY    Past Surgical History:   Procedure Laterality Date     SECTION      1976, 1980, 1983, 1985       FAMILY HISTORY    Family History   Problem Relation Age of Onset    Heart Disease Mother     Heart Disease Father     Cancer Father        SOCIAL HISTORY    Social History     Tobacco Use    Smoking status: Never    Smokeless tobacco: Never   Vaping Use    Vaping status: Never Used   Substance Use Topics    Alcohol use: Not Currently    Drug use: Never       ALLERGIES    No Known Allergies    MEDICATIONS    Current Outpatient Medications on File Prior to Encounter   Medication Sig Dispense Refill    oxyBUTYnin (DITROPAN-XL) 5 MG extended release tablet Take 1 tablet by mouth daily       No current facility-administered medications on file prior to encounter.       REVIEW OF SYSTEMS    Pertinent items are noted in HPI.      Objective:      BP (!) 157/93   Pulse 98   Temp 97.8 °F (36.6 °C)

## 2025-07-10 NOTE — PLAN OF CARE
Pt to the [facility] for initial appointment for left lower leg wound.  Pt has been placing mupuricin to wound.  Wound debrided today per Dr Aguilar.  Pt to place Triad/Gentamicin cream to wound, cover with bordered gauze, and wear Medigrip.  Discussed with patient th importance of elevating legs when sitting.  Dressing supply order sent to [facility].  Pt to follow up in the [facility] in 1 week.  Discharge instructions reviewed with patient, all questions answered, copy given to patient. Dressings were applied to all wounds per M.D. Instructions at this visit.

## 2025-07-11 NOTE — DISCHARGE INSTRUCTIONS
Wound Care Center Physician Orders and Discharge Instructions  [facility]  [address], [address]  [place] [id number]  Telephone: [phone]      Fax: [phone]        Your home care company:   N/a .     Your wound-care supplies will be provided by:  We are ordering your wound-care supplies from [employer]. Please note, depending on your insurance coverage, you may have out-of-pocket expenses for these supplies. Someone from [employer] may call you to confirm your order and discuss those potential costs before they ship your products -- please anticipate that call. If your out-of-pocket cost is going to be less than $200, and [employer] cannot reach you, they may ship your supplies as soon as the order is processed, and will send you a bill. If your out-of-pocket cost is going to be more than $200, [employer] should not ship your supplies until they speak with you. If you have any questions about your supplies or your potential out-of-pocket costs, or if you need to place an order for a refill of supplies (typically monthly), [place] is your local representative, and can be reached at [phone]. Information on [employer]'s return policy will also be enclosed with your supplies -- please read that carefully before opening your items.  .     NAME:  Mercy Fuentes   YOB: 1955  PRIMARY DIAGNOSIS FOR WOUND CARE CENTER:  Non-healing Surgical .     Wound cleansing:   Do not scrub or use excessive force.  Wash hands with soap and water before and after dressing changes.  Prior to applying a clean dressing, cleanse wound with normal saline, wound cleanser, or mild soap and water. Ask your physician or nurse before getting the wound(s) wet in the shower.                Wound care for home:     Left lower leg wounds:  Nexodyn  Mix Triad 80% and Gentamicin 20% to wound  4x4's  Build up with specialist/ABD  Foam to anterior ankle between layers of coflex  Coflex calamine compression

## 2025-07-17 ENCOUNTER — HOSPITAL ENCOUNTER (OUTPATIENT)
Dept: WOUND CARE | Age: 70
Discharge: HOME | End: 2025-07-17
Payer: MEDICARE

## 2025-07-17 VITALS
DIASTOLIC BLOOD PRESSURE: 86 MMHG | HEIGHT: 65 IN | BODY MASS INDEX: 48.82 KG/M2 | WEIGHT: 293 LBS | RESPIRATION RATE: 18 BRPM | HEART RATE: 90 BPM | TEMPERATURE: 97.7 F | SYSTOLIC BLOOD PRESSURE: 170 MMHG

## 2025-07-17 DIAGNOSIS — I89.0 LYMPHEDEMA: Primary | ICD-10-CM

## 2025-07-17 DIAGNOSIS — I73.89 OTHER SPECIFIED PERIPHERAL VASCULAR DISEASES: ICD-10-CM

## 2025-07-17 DIAGNOSIS — L97.222 NON-PRESSURE CHRONIC ULCER OF LEFT CALF WITH FAT LAYER EXPOSED (HCC): ICD-10-CM

## 2025-07-17 PROCEDURE — 11042 DBRDMT SUBQ TIS 1ST 20SQCM/<: CPT

## 2025-07-17 PROCEDURE — 29581 APPL MULTLAYER CMPRN SYS LEG: CPT

## 2025-07-17 RX ORDER — GENTAMICIN SULFATE 1 MG/G
OINTMENT TOPICAL PRN
OUTPATIENT
Start: 2025-07-17

## 2025-07-17 RX ORDER — BETAMETHASONE DIPROPIONATE 0.5 MG/G
CREAM TOPICAL PRN
OUTPATIENT
Start: 2025-07-17

## 2025-07-17 RX ORDER — NEOMYCIN/BACITRACIN/POLYMYXINB 3.5-400-5K
OINTMENT (GRAM) TOPICAL PRN
Status: DISCONTINUED | OUTPATIENT
Start: 2025-07-17 | End: 2025-07-18 | Stop reason: HOSPADM

## 2025-07-17 RX ORDER — NEOMYCIN/BACITRACIN/POLYMYXINB 3.5-400-5K
OINTMENT (GRAM) TOPICAL PRN
OUTPATIENT
Start: 2025-07-17

## 2025-07-17 RX ORDER — MUPIROCIN 2 %
OINTMENT (GRAM) TOPICAL PRN
OUTPATIENT
Start: 2025-07-17

## 2025-07-17 RX ORDER — CLOBETASOL PROPIONATE 0.5 MG/G
OINTMENT TOPICAL PRN
OUTPATIENT
Start: 2025-07-17

## 2025-07-17 RX ORDER — LIDOCAINE HYDROCHLORIDE 20 MG/ML
JELLY TOPICAL PRN
OUTPATIENT
Start: 2025-07-17

## 2025-07-17 RX ORDER — GENTAMICIN SULFATE 1 MG/G
OINTMENT TOPICAL PRN
Status: DISCONTINUED | OUTPATIENT
Start: 2025-07-17 | End: 2025-07-18 | Stop reason: HOSPADM

## 2025-07-17 RX ORDER — BACITRACIN ZINC AND POLYMYXIN B SULFATE 500; 1000 [USP'U]/G; [USP'U]/G
OINTMENT TOPICAL PRN
Status: DISCONTINUED | OUTPATIENT
Start: 2025-07-17 | End: 2025-07-18 | Stop reason: HOSPADM

## 2025-07-17 RX ORDER — TRIAMCINOLONE ACETONIDE 1 MG/G
OINTMENT TOPICAL PRN
OUTPATIENT
Start: 2025-07-17

## 2025-07-17 RX ORDER — TRIAMCINOLONE ACETONIDE 1 MG/G
OINTMENT TOPICAL PRN
Status: DISCONTINUED | OUTPATIENT
Start: 2025-07-17 | End: 2025-07-18 | Stop reason: HOSPADM

## 2025-07-17 RX ORDER — BACITRACIN ZINC 500 [USP'U]/G
OINTMENT TOPICAL PRN
OUTPATIENT
Start: 2025-07-17

## 2025-07-17 RX ORDER — LIDOCAINE 50 MG/G
OINTMENT TOPICAL PRN
OUTPATIENT
Start: 2025-07-17

## 2025-07-17 RX ORDER — BACITRACIN ZINC AND POLYMYXIN B SULFATE 500; 1000 [USP'U]/G; [USP'U]/G
OINTMENT TOPICAL PRN
OUTPATIENT
Start: 2025-07-17

## 2025-07-17 RX ORDER — LIDOCAINE HYDROCHLORIDE 40 MG/ML
SOLUTION TOPICAL PRN
Status: DISCONTINUED | OUTPATIENT
Start: 2025-07-17 | End: 2025-07-18 | Stop reason: HOSPADM

## 2025-07-17 RX ORDER — SILVER SULFADIAZINE 10 MG/G
CREAM TOPICAL PRN
OUTPATIENT
Start: 2025-07-17

## 2025-07-17 RX ORDER — CLOBETASOL PROPIONATE 0.5 MG/G
OINTMENT TOPICAL PRN
Status: DISCONTINUED | OUTPATIENT
Start: 2025-07-17 | End: 2025-07-18 | Stop reason: HOSPADM

## 2025-07-17 RX ORDER — LIDOCAINE 40 MG/G
CREAM TOPICAL PRN
OUTPATIENT
Start: 2025-07-17

## 2025-07-17 RX ORDER — LIDOCAINE HYDROCHLORIDE 40 MG/ML
SOLUTION TOPICAL PRN
OUTPATIENT
Start: 2025-07-17

## 2025-07-17 RX ORDER — MUPIROCIN 2 %
OINTMENT (GRAM) TOPICAL PRN
Status: DISCONTINUED | OUTPATIENT
Start: 2025-07-17 | End: 2025-07-18 | Stop reason: HOSPADM

## 2025-07-17 RX ORDER — SODIUM CHLOR/HYPOCHLOROUS ACID 0.033 %
SOLUTION, IRRIGATION IRRIGATION PRN
OUTPATIENT
Start: 2025-07-17

## 2025-07-17 RX ORDER — LIDOCAINE 40 MG/G
CREAM TOPICAL PRN
Status: DISCONTINUED | OUTPATIENT
Start: 2025-07-17 | End: 2025-07-18 | Stop reason: HOSPADM

## 2025-07-17 RX ORDER — SODIUM CHLOR/HYPOCHLOROUS ACID 0.033 %
SOLUTION, IRRIGATION IRRIGATION PRN
Status: DISCONTINUED | OUTPATIENT
Start: 2025-07-17 | End: 2025-07-18 | Stop reason: HOSPADM

## 2025-07-17 NOTE — PROGRESS NOTES
applicable:           New orders for this week (labs, imaging, medications, etc.):    -Don't get dressing wet.  May purchase Cast protector to cover dressing in the shower.  -Elevate legs at all times when sitting  -If your wrap falls, you notice increased pain/swelling, elevate your leg and give us a call  -\"external fixator\"    Additional instructions for specific diagnoses:           F/U Appointment is with Dr. Aguilar in 1 week, on                                   at                       .     Your nurse  is DERRELL Sandoval.      If we applied slip-resistant hospital socks today, be sure to remove them at least once a day to inspect your toes or feet, even if you're not changing the wraps or dressings underneath. If you see anything concerning (redness, excess moisture, etc), please call and let us know right away.     Should you experience any significant changes in your wound(s) (including redness, increased warmth, increased pain, increased drainage, odor, or fever) or have questions about your wound care, please contact the [facility] at [phone] Monday-Thursday from 8:00 am - 4:30 pm, or Friday from 8:00 am - 2:30 pm.  If you need help with your wound outside these hours and cannot wait until we are again available, contact your home-care company (if applicable), your PCP, or go to the nearest emergency room.        Written Patient Discharge Instructions Given            Electronically signed by Ronnie Aguilar DPM on 7/17/2025 at 8:52 AM

## 2025-07-17 NOTE — PLAN OF CARE
Pt to the [facility] for wound assessment. Wounds stable. Wounds debrided today, pt tolerated well. Plan to apply weekly compression wrap this week. Family at bedside. Re-educated on elevating, fluid intake. Discussed ordering a velcro compression garment in the near future. Pt to continue with documented plan of care and to follow up in the [facility] in 1 week. Pt. aware to call sooner with any problems or questions/concerns. MD orders and D/C instructions reviewed, pt verbalized understanding.

## 2025-07-18 NOTE — DISCHARGE INSTRUCTIONS
in place and clean, dry, intact all week     Please note, all wounds (unless stated otherwise here) were mechanically debrided at the time of cleansing here in the wound-care center today, so a small amount of pain, drainage or bleeding from that process might be expected, and is normal.      All products for home use, including multiple products for a single wound if applicable, are medically necessary in order to achieve the best chance at timely wound healing. See provider documentation for details if needed.     Substituted dressings applied in the [facility] today, if applicable:           New orders for this week (labs, imaging, medications, etc.):  7/24-Velcro compression garment ordered via [employer] today, you may receive a call from an unknown number, please answer as they may be calling to verify your information.      -Don't get dressing wet.  May purchase Cast protector to cover dressing in the shower.  -Elevate legs at all times when sitting  -If your wrap falls, you notice increased pain/swelling, elevate your leg and give us a call  -\"external fixator\"     Additional instructions for specific diagnoses:           F/U Appointment is with Dr. Aguilar in 1 week on                                   at                       .     Your nurse  is DERRELL Sandoval.      If we applied slip-resistant hospital socks today, be sure to remove them at least once a day to inspect your toes or feet, even if you're not changing the wraps or dressings underneath. If you see anything concerning (redness, excess moisture, etc), please call and let us know right away.     Should you experience any significant changes in your wound(s) (including redness, increased warmth, increased pain, increased drainage, odor, or fever) or have questions about your wound care, please contact the [facility] at [phone] Monday-Thursday from 8:00 am - 4:30 pm, or Friday from 8:00 am - 2:30 pm.  If you need help with

## 2025-07-24 ENCOUNTER — HOSPITAL ENCOUNTER (OUTPATIENT)
Dept: WOUND CARE | Age: 70
Discharge: HOME | End: 2025-07-24
Attending: PODIATRIST
Payer: MEDICARE

## 2025-07-24 VITALS
WEIGHT: 293 LBS | HEART RATE: 85 BPM | SYSTOLIC BLOOD PRESSURE: 162 MMHG | HEIGHT: 65 IN | TEMPERATURE: 97 F | DIASTOLIC BLOOD PRESSURE: 92 MMHG | BODY MASS INDEX: 48.82 KG/M2 | RESPIRATION RATE: 18 BRPM

## 2025-07-24 DIAGNOSIS — I73.89 OTHER SPECIFIED PERIPHERAL VASCULAR DISEASES: ICD-10-CM

## 2025-07-24 DIAGNOSIS — I89.0 LYMPHEDEMA: Primary | ICD-10-CM

## 2025-07-24 DIAGNOSIS — L97.222 NON-PRESSURE CHRONIC ULCER OF LEFT CALF WITH FAT LAYER EXPOSED (HCC): ICD-10-CM

## 2025-07-24 PROCEDURE — 29581 APPL MULTLAYER CMPRN SYS LEG: CPT

## 2025-07-24 PROCEDURE — 11042 DBRDMT SUBQ TIS 1ST 20SQCM/<: CPT

## 2025-07-24 RX ORDER — LIDOCAINE 50 MG/G
OINTMENT TOPICAL PRN
Status: DISCONTINUED | OUTPATIENT
Start: 2025-07-24 | End: 2025-07-25 | Stop reason: HOSPADM

## 2025-07-24 RX ORDER — LIDOCAINE 50 MG/G
OINTMENT TOPICAL PRN
OUTPATIENT
Start: 2025-07-24

## 2025-07-24 RX ORDER — SILVER SULFADIAZINE 10 MG/G
CREAM TOPICAL PRN
OUTPATIENT
Start: 2025-07-24

## 2025-07-24 RX ORDER — BACITRACIN ZINC AND POLYMYXIN B SULFATE 500; 1000 [USP'U]/G; [USP'U]/G
OINTMENT TOPICAL PRN
Status: DISCONTINUED | OUTPATIENT
Start: 2025-07-24 | End: 2025-07-25 | Stop reason: HOSPADM

## 2025-07-24 RX ORDER — LIDOCAINE HYDROCHLORIDE 20 MG/ML
JELLY TOPICAL PRN
OUTPATIENT
Start: 2025-07-24

## 2025-07-24 RX ORDER — LIDOCAINE 40 MG/G
CREAM TOPICAL PRN
OUTPATIENT
Start: 2025-07-24

## 2025-07-24 RX ORDER — LIDOCAINE HYDROCHLORIDE 40 MG/ML
SOLUTION TOPICAL PRN
OUTPATIENT
Start: 2025-07-24

## 2025-07-24 RX ORDER — CLOBETASOL PROPIONATE 0.5 MG/G
OINTMENT TOPICAL PRN
OUTPATIENT
Start: 2025-07-24

## 2025-07-24 RX ORDER — SODIUM CHLOR/HYPOCHLOROUS ACID 0.033 %
SOLUTION, IRRIGATION IRRIGATION PRN
OUTPATIENT
Start: 2025-07-24

## 2025-07-24 RX ORDER — SODIUM CHLOR/HYPOCHLOROUS ACID 0.033 %
SOLUTION, IRRIGATION IRRIGATION PRN
Status: DISCONTINUED | OUTPATIENT
Start: 2025-07-24 | End: 2025-07-25 | Stop reason: HOSPADM

## 2025-07-24 RX ORDER — BACITRACIN ZINC 500 [USP'U]/G
OINTMENT TOPICAL PRN
Status: DISCONTINUED | OUTPATIENT
Start: 2025-07-24 | End: 2025-07-25 | Stop reason: HOSPADM

## 2025-07-24 RX ORDER — BETAMETHASONE DIPROPIONATE 0.5 MG/G
CREAM TOPICAL PRN
OUTPATIENT
Start: 2025-07-24

## 2025-07-24 RX ORDER — TRIAMCINOLONE ACETONIDE 1 MG/G
OINTMENT TOPICAL PRN
Status: DISCONTINUED | OUTPATIENT
Start: 2025-07-24 | End: 2025-07-25 | Stop reason: HOSPADM

## 2025-07-24 RX ORDER — MUPIROCIN 2 %
OINTMENT (GRAM) TOPICAL PRN
Status: DISCONTINUED | OUTPATIENT
Start: 2025-07-24 | End: 2025-07-25 | Stop reason: HOSPADM

## 2025-07-24 RX ORDER — MUPIROCIN 2 %
OINTMENT (GRAM) TOPICAL PRN
OUTPATIENT
Start: 2025-07-24

## 2025-07-24 RX ORDER — SILVER SULFADIAZINE 10 MG/G
CREAM TOPICAL PRN
Status: DISCONTINUED | OUTPATIENT
Start: 2025-07-24 | End: 2025-07-25 | Stop reason: HOSPADM

## 2025-07-24 RX ORDER — BACITRACIN ZINC 500 [USP'U]/G
OINTMENT TOPICAL PRN
OUTPATIENT
Start: 2025-07-24

## 2025-07-24 RX ORDER — LIDOCAINE HYDROCHLORIDE 20 MG/ML
JELLY TOPICAL PRN
Status: DISCONTINUED | OUTPATIENT
Start: 2025-07-24 | End: 2025-07-25 | Stop reason: HOSPADM

## 2025-07-24 RX ORDER — BETAMETHASONE DIPROPIONATE 0.5 MG/G
CREAM TOPICAL PRN
Status: DISCONTINUED | OUTPATIENT
Start: 2025-07-24 | End: 2025-07-25 | Stop reason: HOSPADM

## 2025-07-24 RX ORDER — GENTAMICIN SULFATE 1 MG/G
OINTMENT TOPICAL PRN
Status: DISCONTINUED | OUTPATIENT
Start: 2025-07-24 | End: 2025-07-25 | Stop reason: HOSPADM

## 2025-07-24 RX ORDER — TRIAMCINOLONE ACETONIDE 1 MG/G
OINTMENT TOPICAL PRN
OUTPATIENT
Start: 2025-07-24

## 2025-07-24 RX ORDER — CLOBETASOL PROPIONATE 0.5 MG/G
OINTMENT TOPICAL PRN
Status: DISCONTINUED | OUTPATIENT
Start: 2025-07-24 | End: 2025-07-25 | Stop reason: HOSPADM

## 2025-07-24 RX ORDER — LIDOCAINE 40 MG/G
CREAM TOPICAL PRN
Status: DISCONTINUED | OUTPATIENT
Start: 2025-07-24 | End: 2025-07-25 | Stop reason: HOSPADM

## 2025-07-24 RX ORDER — NEOMYCIN/BACITRACIN/POLYMYXINB 3.5-400-5K
OINTMENT (GRAM) TOPICAL PRN
Status: DISCONTINUED | OUTPATIENT
Start: 2025-07-24 | End: 2025-07-25 | Stop reason: HOSPADM

## 2025-07-24 RX ORDER — NEOMYCIN/BACITRACIN/POLYMYXINB 3.5-400-5K
OINTMENT (GRAM) TOPICAL PRN
OUTPATIENT
Start: 2025-07-24

## 2025-07-24 RX ORDER — BACITRACIN ZINC AND POLYMYXIN B SULFATE 500; 1000 [USP'U]/G; [USP'U]/G
OINTMENT TOPICAL PRN
OUTPATIENT
Start: 2025-07-24

## 2025-07-24 RX ORDER — LIDOCAINE HYDROCHLORIDE 40 MG/ML
SOLUTION TOPICAL PRN
Status: DISCONTINUED | OUTPATIENT
Start: 2025-07-24 | End: 2025-07-25 | Stop reason: HOSPADM

## 2025-07-24 RX ORDER — GENTAMICIN SULFATE 1 MG/G
OINTMENT TOPICAL PRN
OUTPATIENT
Start: 2025-07-24

## 2025-07-24 NOTE — PROGRESS NOTES
[facility]  Progress Note and Procedure Note      Mercy Fuentes  AGE: 69 y.o.   GENDER: female  : 1955  TODAY'S DATE:  2025    Subjective:     No chief complaint on file.        HISTORY of PRESENT ILLNESS HPI     Mercy Fuentes is a 69 y.o. female who presents today for wound evaluation.   History of Wound: Patient admits to having a slow healing wound following surgical revision of a failed fracture repair of her lower leg starting with initial injury and .  She has recently been using a topical antibiotic prescribed by her podiatrist.  She has been referred to the wound care center for further treatment due to nonhealing wound.  She has left the dressing intact as directed.  She has no other complaints at this time.  She states that the pain has improved some.    Wound Pain:  intermittent  Severity:  2 / 10   Wound Type:  non-healing surgical and traumatic  Modifying Factors:  edema, venous stasis, lymphedema, and obesity  Associated Signs/Symptoms:  edema and drainage        PAST MEDICAL HISTORY    History reviewed. No pertinent past medical history.    PAST SURGICAL HISTORY    Past Surgical History:   Procedure Laterality Date     SECTION      1976, 1980, 1983, 1985       FAMILY HISTORY    Family History   Problem Relation Age of Onset    Heart Disease Mother     Heart Disease Father     Cancer Father        SOCIAL HISTORY    Social History     Tobacco Use    Smoking status: Never    Smokeless tobacco: Never   Vaping Use    Vaping status: Never Used   Substance Use Topics    Alcohol use: Not Currently    Drug use: Never       ALLERGIES    No Known Allergies    MEDICATIONS    Current Outpatient Medications on File Prior to Encounter   Medication Sig Dispense Refill    oxyBUTYnin (DITROPAN-XL) 5 MG extended release tablet Take 1 tablet by mouth daily       No current facility-administered medications on file prior to encounter.       REVIEW OF

## 2025-07-24 NOTE — PLAN OF CARE
Pt to the [facility] for wound assessment. Left distal leg wound healed. Left lateral/proximal leg wound stable. Wounds debrided today, pt tolerated well. Pt tolerated compression wrap this week, will plan to continue. Will order long term compression garment (juxtafit) through [employer] today. Pt to continue with documented plan of care and to follow up in the [facility] in 1 week. Pt. aware to call sooner with any problems or questions/concerns. MD orders and D/C instructions reviewed, pt verbalized understanding.

## 2025-07-24 NOTE — PLAN OF CARE
Compression Garments    Supply Company for Compression Stockings:     [facility] [address] [place] [id number]  p: [phone] f: [phone]     Ordering Center:     [facility]  [address] [place] [address]  [place] [address]  [phone]  WOUND CARE Dept: [phone]   FAX NUMBER     Patient Information:      Mercy JAH Fuentes  [address]  [place] [address]   [phone]   : 1955  AGE: 69 y.o.     GENDER: female   TODAYS DATE:  2025    Insurance:      PRIMARY INSURANCE:  Plan: [employer] MEDICARE-ADVANTAGE PPO  Coverage: [employer] MEDICARE  Effective Date: 2015  Group Number: [unfilled]  Subscriber Number: [id number] - (Medicare Managed)    SECONDARY INSURANCE:  Plan:   Coverage:   Effective Date:   [unfilled]    [unfilled]     [unfilled]   [unfilled]     Patient Information:      Problem List Items Addressed This Visit          Circulatory    Other specified peripheral vascular diseases    Relevant Medications    lidocaine 4 % jelly    lidocaine (XYLOCAINE) 2 % jelly    lidocaine (XYLOCAINE) 2 % uro-jet    lidocaine (XYLOCAINE) 4 % external solution    lidocaine (LMX) 4 % cream    lidocaine (XYLOCAINE) 5 % ointment    Lidocaine HCl 2 % LIQD    silver nitrate applicators applicator 1 each    augmented betamethasone dipropionate (DIPROLENE-AF) 0.05 % cream    silver sulfADIAZINE (SILVADENE) 1 % cream    cadexomer iodine (IODOSORB) 0.9 % gel    bacitracin zinc ointment    bacitracin-polymyxin b (POLYSPORIN) ointment    clobetasol (TEMOVATE) 0.05 % ointment    collagenase ointment    gentamicin (GARAMYCIN) 0.1 % ointment    mupirocin (BACTROBAN) 2 % ointment    neomycin-bacitracin-polymyxin (NEOSPORIN) ointment    triamcinolone (KENALOG) 0.1 % ointment    miconazole (MICOTIN) 2 % powder (Start on 2025  8:45 AM)    Sodium Hypochlorite (DAKINS) 0.125 % quarter strength external soln    Sodium Hypochlorite (DAKINS) 0.25 % half strength

## 2025-07-25 NOTE — DISCHARGE INSTRUCTIONS
Wound Care Center Physician Orders and Discharge Instructions  [facility]  [address], [address]  [place] [id number]  Telephone: [phone]      Fax: [phone]        Your home care company:   N/a .     Your wound-care supplies will be provided by:  We are ordering your wound-care supplies from [employer]. Please note, depending on your insurance coverage, you may have out-of-pocket expenses for these supplies. Someone from [employer] may call you to confirm your order and discuss those potential costs before they ship your products -- please anticipate that call. If your out-of-pocket cost is going to be less than $200, and [employer] cannot reach you, they may ship your supplies as soon as the order is processed, and will send you a bill. If your out-of-pocket cost is going to be more than $200, [employer] should not ship your supplies until they speak with you. If you have any questions about your supplies or your potential out-of-pocket costs, or if you need to place an order for a refill of supplies (typically monthly), [place] is your local representative, and can be reached at [phone]. Information on [employer]'s return policy will also be enclosed with your supplies -- please read that carefully before opening your items.  .     NAME:  Mercy Fuentes   YOB: 1955  PRIMARY DIAGNOSIS FOR WOUND CARE CENTER:  Non-healing Surgical .     Wound cleansing:   Do not scrub or use excessive force.  Wash hands with soap and water before and after dressing changes.  Prior to applying a clean dressing, cleanse wound with normal saline, wound cleanser, or mild soap and water. Ask your physician or nurse before getting the wound(s) wet in the shower.                Wound care for home:     Left lower leg wound:  Nexodyn  Collagen with silver  4x4's  1st layer coflex  Build up with specialist/ABD  Foam to anterior ankle   2nd layer Coflex calamine compression wrap  Nylon  Keep in place

## 2025-07-31 ENCOUNTER — HOSPITAL ENCOUNTER (OUTPATIENT)
Dept: WOUND CARE | Age: 70
Discharge: HOME | End: 2025-07-31
Payer: MEDICARE

## 2025-07-31 VITALS
RESPIRATION RATE: 18 BRPM | HEIGHT: 65 IN | TEMPERATURE: 97.8 F | HEART RATE: 97 BPM | SYSTOLIC BLOOD PRESSURE: 145 MMHG | WEIGHT: 293 LBS | BODY MASS INDEX: 48.82 KG/M2 | DIASTOLIC BLOOD PRESSURE: 84 MMHG

## 2025-07-31 DIAGNOSIS — I89.0 LYMPHEDEMA: Primary | ICD-10-CM

## 2025-07-31 DIAGNOSIS — L97.222 NON-PRESSURE CHRONIC ULCER OF LEFT CALF WITH FAT LAYER EXPOSED (HCC): ICD-10-CM

## 2025-07-31 DIAGNOSIS — I73.89 OTHER SPECIFIED PERIPHERAL VASCULAR DISEASES: ICD-10-CM

## 2025-07-31 PROCEDURE — 11042 DBRDMT SUBQ TIS 1ST 20SQCM/<: CPT

## 2025-07-31 PROCEDURE — 29581 APPL MULTLAYER CMPRN SYS LEG: CPT

## 2025-07-31 RX ORDER — LIDOCAINE 40 MG/G
CREAM TOPICAL PRN
Status: DISCONTINUED | OUTPATIENT
Start: 2025-07-31 | End: 2025-08-01 | Stop reason: HOSPADM

## 2025-07-31 RX ORDER — SILVER SULFADIAZINE 10 MG/G
CREAM TOPICAL PRN
OUTPATIENT
Start: 2025-07-31

## 2025-07-31 RX ORDER — LIDOCAINE HYDROCHLORIDE 20 MG/ML
JELLY TOPICAL PRN
Status: DISCONTINUED | OUTPATIENT
Start: 2025-07-31 | End: 2025-08-01 | Stop reason: HOSPADM

## 2025-07-31 RX ORDER — NEOMYCIN/BACITRACIN/POLYMYXINB 3.5-400-5K
OINTMENT (GRAM) TOPICAL PRN
OUTPATIENT
Start: 2025-07-31

## 2025-07-31 RX ORDER — SODIUM CHLOR/HYPOCHLOROUS ACID 0.033 %
SOLUTION, IRRIGATION IRRIGATION PRN
Status: DISCONTINUED | OUTPATIENT
Start: 2025-07-31 | End: 2025-08-01 | Stop reason: HOSPADM

## 2025-07-31 RX ORDER — LIDOCAINE 50 MG/G
OINTMENT TOPICAL PRN
OUTPATIENT
Start: 2025-07-31

## 2025-07-31 RX ORDER — CLOBETASOL PROPIONATE 0.5 MG/G
OINTMENT TOPICAL PRN
OUTPATIENT
Start: 2025-07-31

## 2025-07-31 RX ORDER — LIDOCAINE HYDROCHLORIDE 20 MG/ML
JELLY TOPICAL PRN
OUTPATIENT
Start: 2025-07-31

## 2025-07-31 RX ORDER — SILVER SULFADIAZINE 10 MG/G
CREAM TOPICAL PRN
Status: DISCONTINUED | OUTPATIENT
Start: 2025-07-31 | End: 2025-08-01 | Stop reason: HOSPADM

## 2025-07-31 RX ORDER — MUPIROCIN 2 %
OINTMENT (GRAM) TOPICAL PRN
OUTPATIENT
Start: 2025-07-31

## 2025-07-31 RX ORDER — GENTAMICIN SULFATE 1 MG/G
OINTMENT TOPICAL PRN
OUTPATIENT
Start: 2025-07-31

## 2025-07-31 RX ORDER — LIDOCAINE HYDROCHLORIDE 40 MG/ML
SOLUTION TOPICAL PRN
Status: DISCONTINUED | OUTPATIENT
Start: 2025-07-31 | End: 2025-08-01 | Stop reason: HOSPADM

## 2025-07-31 RX ORDER — NEOMYCIN/BACITRACIN/POLYMYXINB 3.5-400-5K
OINTMENT (GRAM) TOPICAL PRN
Status: DISCONTINUED | OUTPATIENT
Start: 2025-07-31 | End: 2025-08-01 | Stop reason: HOSPADM

## 2025-07-31 RX ORDER — BETAMETHASONE DIPROPIONATE 0.5 MG/G
CREAM TOPICAL PRN
OUTPATIENT
Start: 2025-07-31

## 2025-07-31 RX ORDER — BACITRACIN ZINC 500 [USP'U]/G
OINTMENT TOPICAL PRN
OUTPATIENT
Start: 2025-07-31

## 2025-07-31 RX ORDER — BACITRACIN ZINC 500 [USP'U]/G
OINTMENT TOPICAL PRN
Status: DISCONTINUED | OUTPATIENT
Start: 2025-07-31 | End: 2025-08-01 | Stop reason: HOSPADM

## 2025-07-31 RX ORDER — BETAMETHASONE DIPROPIONATE 0.5 MG/G
CREAM TOPICAL PRN
Status: DISCONTINUED | OUTPATIENT
Start: 2025-07-31 | End: 2025-08-01 | Stop reason: HOSPADM

## 2025-07-31 RX ORDER — LIDOCAINE 40 MG/G
CREAM TOPICAL PRN
OUTPATIENT
Start: 2025-07-31

## 2025-07-31 RX ORDER — LIDOCAINE HYDROCHLORIDE 40 MG/ML
SOLUTION TOPICAL PRN
OUTPATIENT
Start: 2025-07-31

## 2025-07-31 RX ORDER — TRIAMCINOLONE ACETONIDE 1 MG/G
OINTMENT TOPICAL PRN
Status: DISCONTINUED | OUTPATIENT
Start: 2025-07-31 | End: 2025-08-01 | Stop reason: HOSPADM

## 2025-07-31 RX ORDER — TRIAMCINOLONE ACETONIDE 1 MG/G
OINTMENT TOPICAL PRN
OUTPATIENT
Start: 2025-07-31

## 2025-07-31 RX ORDER — MUPIROCIN 2 %
OINTMENT (GRAM) TOPICAL PRN
Status: DISCONTINUED | OUTPATIENT
Start: 2025-07-31 | End: 2025-08-01 | Stop reason: HOSPADM

## 2025-07-31 RX ORDER — LIDOCAINE 50 MG/G
OINTMENT TOPICAL PRN
Status: DISCONTINUED | OUTPATIENT
Start: 2025-07-31 | End: 2025-08-01 | Stop reason: HOSPADM

## 2025-07-31 RX ORDER — CLOBETASOL PROPIONATE 0.5 MG/G
OINTMENT TOPICAL PRN
Status: DISCONTINUED | OUTPATIENT
Start: 2025-07-31 | End: 2025-08-01 | Stop reason: HOSPADM

## 2025-07-31 RX ORDER — BACITRACIN ZINC AND POLYMYXIN B SULFATE 500; 1000 [USP'U]/G; [USP'U]/G
OINTMENT TOPICAL PRN
Status: DISCONTINUED | OUTPATIENT
Start: 2025-07-31 | End: 2025-08-01 | Stop reason: HOSPADM

## 2025-07-31 RX ORDER — GENTAMICIN SULFATE 1 MG/G
OINTMENT TOPICAL PRN
Status: DISCONTINUED | OUTPATIENT
Start: 2025-07-31 | End: 2025-08-01 | Stop reason: HOSPADM

## 2025-07-31 RX ORDER — BACITRACIN ZINC AND POLYMYXIN B SULFATE 500; 1000 [USP'U]/G; [USP'U]/G
OINTMENT TOPICAL PRN
OUTPATIENT
Start: 2025-07-31

## 2025-07-31 RX ORDER — SODIUM CHLOR/HYPOCHLOROUS ACID 0.033 %
SOLUTION, IRRIGATION IRRIGATION PRN
OUTPATIENT
Start: 2025-07-31

## 2025-07-31 NOTE — PLAN OF CARE
Pt to the [facility] for wound assessment. Wounds stable, scar tissue noted. Wounds debrided today, pt tolerated well. Pt received juzo compression wrap and brought to her [facility] visit today (stored in [facility] locker). Pt to continue with documented plan of care and to follow up in the [facility] in 1 week. Pt. aware to call sooner with any problems or questions/concerns. MD orders and D/C instructions reviewed, pt verbalized understanding.

## 2025-07-31 NOTE — PROGRESS NOTES
[facility]  Progress Note and Procedure Note      Mercy Fuentes  AGE: 69 y.o.   GENDER: female  : 1955  TODAY'S DATE:  2025    Subjective:     Chief Complaint   Patient presents with    Wound Check         HISTORY of PRESENT ILLNESS HPI     Mercy Fuentes is a 69 y.o. female who presents today for wound evaluation.   History of Wound: Patient admits to having a slow healing wound following surgical revision of a failed fracture repair of her lower leg starting with initial injury and .  She has recently been using a topical antibiotic prescribed by her podiatrist.  She has been referred to the wound care center for further treatment due to nonhealing wound.  She has left the dressing intact as directed.  She has no other complaints at this time.  She states that the pain has improved some.    Wound Pain:  intermittent  Severity:  2 / 10   Wound Type:  non-healing surgical and traumatic  Modifying Factors:  edema, venous stasis, lymphedema, and obesity  Associated Signs/Symptoms:  edema and drainage        PAST MEDICAL HISTORY    History reviewed. No pertinent past medical history.    PAST SURGICAL HISTORY    Past Surgical History:   Procedure Laterality Date     SECTION      1976, 1980, 1983, 1985       FAMILY HISTORY    Family History   Problem Relation Age of Onset    Heart Disease Mother     Heart Disease Father     Cancer Father        SOCIAL HISTORY    Social History     Tobacco Use    Smoking status: Never    Smokeless tobacco: Never   Vaping Use    Vaping status: Never Used   Substance Use Topics    Alcohol use: Not Currently    Drug use: Never       ALLERGIES    No Known Allergies    MEDICATIONS    Current Outpatient Medications on File Prior to Encounter   Medication Sig Dispense Refill    oxyBUTYnin (DITROPAN-XL) 5 MG extended release tablet Take 1 tablet by mouth daily       No current facility-administered medications on file prior to encounter.

## 2025-08-07 ENCOUNTER — HOSPITAL ENCOUNTER (OUTPATIENT)
Dept: WOUND CARE | Age: 70
Discharge: HOME | End: 2025-08-07
Attending: PODIATRIST
Payer: MEDICARE

## 2025-08-07 VITALS
WEIGHT: 293 LBS | TEMPERATURE: 97.7 F | BODY MASS INDEX: 48.82 KG/M2 | HEIGHT: 65 IN | SYSTOLIC BLOOD PRESSURE: 168 MMHG | HEART RATE: 73 BPM | RESPIRATION RATE: 18 BRPM | DIASTOLIC BLOOD PRESSURE: 84 MMHG

## 2025-08-07 DIAGNOSIS — I89.0 LYMPHEDEMA: Primary | ICD-10-CM

## 2025-08-07 DIAGNOSIS — I73.89 OTHER SPECIFIED PERIPHERAL VASCULAR DISEASES: ICD-10-CM

## 2025-08-07 DIAGNOSIS — L97.222 NON-PRESSURE CHRONIC ULCER OF LEFT CALF WITH FAT LAYER EXPOSED (HCC): ICD-10-CM

## 2025-08-07 PROCEDURE — 99212 OFFICE O/P EST SF 10 MIN: CPT

## 2025-08-07 RX ORDER — LIDOCAINE HYDROCHLORIDE 40 MG/ML
SOLUTION TOPICAL PRN
OUTPATIENT
Start: 2025-08-07

## 2025-08-07 RX ORDER — GENTAMICIN SULFATE 1 MG/G
OINTMENT TOPICAL PRN
OUTPATIENT
Start: 2025-08-07

## 2025-08-07 RX ORDER — LIDOCAINE 40 MG/G
CREAM TOPICAL PRN
OUTPATIENT
Start: 2025-08-07

## 2025-08-07 RX ORDER — CLOBETASOL PROPIONATE 0.5 MG/G
OINTMENT TOPICAL PRN
OUTPATIENT
Start: 2025-08-07

## 2025-08-07 RX ORDER — NEOMYCIN/BACITRACIN/POLYMYXINB 3.5-400-5K
OINTMENT (GRAM) TOPICAL PRN
Status: DISCONTINUED | OUTPATIENT
Start: 2025-08-07 | End: 2025-08-08 | Stop reason: HOSPADM

## 2025-08-07 RX ORDER — CLOBETASOL PROPIONATE 0.5 MG/G
OINTMENT TOPICAL PRN
Status: DISCONTINUED | OUTPATIENT
Start: 2025-08-07 | End: 2025-08-08 | Stop reason: HOSPADM

## 2025-08-07 RX ORDER — MUPIROCIN 2 %
OINTMENT (GRAM) TOPICAL PRN
OUTPATIENT
Start: 2025-08-07

## 2025-08-07 RX ORDER — NEOMYCIN/BACITRACIN/POLYMYXINB 3.5-400-5K
OINTMENT (GRAM) TOPICAL PRN
OUTPATIENT
Start: 2025-08-07

## 2025-08-07 RX ORDER — GENTAMICIN SULFATE 1 MG/G
OINTMENT TOPICAL PRN
Status: DISCONTINUED | OUTPATIENT
Start: 2025-08-07 | End: 2025-08-08 | Stop reason: HOSPADM

## 2025-08-07 RX ORDER — BETAMETHASONE DIPROPIONATE 0.5 MG/G
CREAM TOPICAL PRN
OUTPATIENT
Start: 2025-08-07

## 2025-08-07 RX ORDER — MUPIROCIN 2 %
OINTMENT (GRAM) TOPICAL PRN
Status: DISCONTINUED | OUTPATIENT
Start: 2025-08-07 | End: 2025-08-08 | Stop reason: HOSPADM

## 2025-08-07 RX ORDER — BACITRACIN ZINC AND POLYMYXIN B SULFATE 500; 1000 [USP'U]/G; [USP'U]/G
OINTMENT TOPICAL PRN
Status: DISCONTINUED | OUTPATIENT
Start: 2025-08-07 | End: 2025-08-08 | Stop reason: HOSPADM

## 2025-08-07 RX ORDER — LIDOCAINE 50 MG/G
OINTMENT TOPICAL PRN
OUTPATIENT
Start: 2025-08-07

## 2025-08-07 RX ORDER — LIDOCAINE HYDROCHLORIDE 20 MG/ML
JELLY TOPICAL PRN
OUTPATIENT
Start: 2025-08-07

## 2025-08-07 RX ORDER — LIDOCAINE 50 MG/G
OINTMENT TOPICAL PRN
Status: DISCONTINUED | OUTPATIENT
Start: 2025-08-07 | End: 2025-08-08 | Stop reason: HOSPADM

## 2025-08-07 RX ORDER — LIDOCAINE 40 MG/G
CREAM TOPICAL PRN
Status: DISCONTINUED | OUTPATIENT
Start: 2025-08-07 | End: 2025-08-08 | Stop reason: HOSPADM

## 2025-08-07 RX ORDER — SILVER SULFADIAZINE 10 MG/G
CREAM TOPICAL PRN
OUTPATIENT
Start: 2025-08-07

## 2025-08-07 RX ORDER — LIDOCAINE HYDROCHLORIDE 40 MG/ML
SOLUTION TOPICAL PRN
Status: DISCONTINUED | OUTPATIENT
Start: 2025-08-07 | End: 2025-08-08 | Stop reason: HOSPADM

## 2025-08-07 RX ORDER — BACITRACIN ZINC 500 [USP'U]/G
OINTMENT TOPICAL PRN
Status: DISCONTINUED | OUTPATIENT
Start: 2025-08-07 | End: 2025-08-08 | Stop reason: HOSPADM

## 2025-08-07 RX ORDER — LIDOCAINE HYDROCHLORIDE 20 MG/ML
JELLY TOPICAL PRN
Status: DISCONTINUED | OUTPATIENT
Start: 2025-08-07 | End: 2025-08-08 | Stop reason: HOSPADM

## 2025-08-07 RX ORDER — TRIAMCINOLONE ACETONIDE 1 MG/G
OINTMENT TOPICAL PRN
OUTPATIENT
Start: 2025-08-07

## 2025-08-07 RX ORDER — SODIUM CHLOR/HYPOCHLOROUS ACID 0.033 %
SOLUTION, IRRIGATION IRRIGATION PRN
Status: DISCONTINUED | OUTPATIENT
Start: 2025-08-07 | End: 2025-08-08 | Stop reason: HOSPADM

## 2025-08-07 RX ORDER — TRIAMCINOLONE ACETONIDE 1 MG/G
OINTMENT TOPICAL PRN
Status: DISCONTINUED | OUTPATIENT
Start: 2025-08-07 | End: 2025-08-08 | Stop reason: HOSPADM

## 2025-08-07 RX ORDER — BACITRACIN ZINC AND POLYMYXIN B SULFATE 500; 1000 [USP'U]/G; [USP'U]/G
OINTMENT TOPICAL PRN
OUTPATIENT
Start: 2025-08-07

## 2025-08-07 RX ORDER — SODIUM CHLOR/HYPOCHLOROUS ACID 0.033 %
SOLUTION, IRRIGATION IRRIGATION PRN
OUTPATIENT
Start: 2025-08-07

## 2025-08-07 RX ORDER — BACITRACIN ZINC 500 [USP'U]/G
OINTMENT TOPICAL PRN
OUTPATIENT
Start: 2025-08-07